# Patient Record
Sex: FEMALE | Race: WHITE | URBAN - METROPOLITAN AREA
[De-identification: names, ages, dates, MRNs, and addresses within clinical notes are randomized per-mention and may not be internally consistent; named-entity substitution may affect disease eponyms.]

---

## 2017-03-02 ENCOUNTER — OFFICE VISIT (OUTPATIENT)
Dept: CARDIOLOGY CLINIC | Age: 82
End: 2017-03-02

## 2017-03-02 VITALS
DIASTOLIC BLOOD PRESSURE: 77 MMHG | HEIGHT: 66 IN | HEART RATE: 50 BPM | SYSTOLIC BLOOD PRESSURE: 148 MMHG | BODY MASS INDEX: 27.8 KG/M2 | WEIGHT: 173 LBS

## 2017-03-02 DIAGNOSIS — I48.0 PAROXYSMAL ATRIAL FIBRILLATION (HCC): Primary | ICD-10-CM

## 2017-03-02 DIAGNOSIS — E78.00 HYPERCHOLESTEROLEMIA: ICD-10-CM

## 2017-03-02 DIAGNOSIS — I10 ESSENTIAL HYPERTENSION, BENIGN: ICD-10-CM

## 2017-03-02 DIAGNOSIS — I36.9 TRICUSPID VALVE DISORDERS, SPECIFIED AS NONRHEUMATIC: ICD-10-CM

## 2017-03-02 DIAGNOSIS — I08.0 MITRAL VALVE INSUFFICIENCY AND AORTIC VALVE INSUFFICIENCY: ICD-10-CM

## 2017-03-02 NOTE — LETTER
[place] 1935 
 
3/2/2017 Dear Gi Cartagena MD 
 
I had the pleasure of evaluating  Ms. Facundo Bray in office today. Below are the relevant portions of my assessment and plan of care. ICD-10-CM ICD-9-CM 1. Paroxysmal atrial fibrillation (HCC) I48.0 427.31   
 stable 
no recurrence 2. Essential hypertension, benign I10 401.1   
 mildly elevated 
monitor 3. Mitral valve insufficiency and aortic valve insufficiency I08.0 396.3   
 mild 
asymptomatic 4. Tricuspid valve disorders, specified as nonrheumatic I36.9 424.2   
 tr stable 5. Hypercholesterolemia E78.00 272.0   
 ldl 133 
9/2016 
discussed  diet Current Outpatient Prescriptions Medication Sig Dispense Refill  cholecalciferol (VITAMIN D3) 1,000 unit tablet Take  by mouth daily.  b complex-vitamin c-folic acid 5mg (DIATX,DEXFOL) tab tablet Take 1 tablet by mouth daily.  vitamin E (AQUA GEMS) 400 unit capsule Take  by mouth daily.  losartan (COZAAR) 25 mg tablet Take  by mouth daily.  diltiazem hcl 360 mg Tb24 Take  by mouth.  aspirin delayed-release 81 mg tablet Take  by mouth daily.  pravastatin (PRAVACHOL) 40 mg tablet Take 40 mg by mouth nightly.  gemfibrozil (LOPID) 600 mg tablet Take 600 mg by mouth two (2) times a day.  carvedilol (COREG) 12.5 mg tablet Take  by mouth two (2) times daily (with meals).  POLYETHYLENE GLYCOL 3350 (MIRALAX PO) Take  by mouth as needed.  Glucosamine Sulfate Sodium Cl 1,000 mg Tab Take  by mouth two (2) times a day.  multivitamin (ONE A DAY) tablet Take 1 Tab by mouth daily.  magnesium oxide 250 mg tablet Take 250 mg by mouth daily. No orders of the defined types were placed in this encounter. If you have questions, please do not hesitate to call me. I look forward to following Ms. Facundo Bray along with you. Sincerely, Africa Geller MD

## 2017-03-02 NOTE — PROGRESS NOTES
HISTORY OF PRESENT ILLNESS  To Olvera is a 80 y.o. female. HPI Comments: Patient with A fib,htn,mr,ai,tr. On follow up patient denies any chest pains,sob, palpitation or other significant symptoms. Irregular Heart Beat    Pertinent negatives include no fever, no chest pain, no claudication, no orthopnea, no PND, no abdominal pain, no nausea, no vomiting, no headaches, no dizziness, no weakness, no cough, no hemoptysis, no shortness of breath and no sputum production. Valvular Heart Disease   The history is provided by the patient. This is a chronic problem. The problem occurs constantly. The problem has not changed since onset. Pertinent negatives include no chest pain, no abdominal pain, no headaches and no shortness of breath. Review of Systems   Constitutional: Negative for chills and fever. HENT: Negative for nosebleeds. Eyes: Negative for blurred vision and double vision. Respiratory: Negative for cough, hemoptysis, sputum production, shortness of breath and wheezing. Cardiovascular: Positive for palpitations. Negative for chest pain, orthopnea, claudication, leg swelling and PND. Gastrointestinal: Negative for abdominal pain, heartburn, nausea and vomiting. Musculoskeletal: Negative for myalgias. Skin: Negative for rash. Neurological: Negative for dizziness, weakness and headaches. Endo/Heme/Allergies: Does not bruise/bleed easily.      Family History   Problem Relation Age of Onset    Heart Disease Neg Hx      No family history of heart disease       Past Medical History:   Diagnosis Date    Atrial fibrillation (HCC)     Back in sr, will continue pradaxa for 4 weeks, holter in 3 week and idecide on stopping pradaxa    Essential hypertension, benign     Mildly elevated    Hypercholesterolemia     Mitral valve insufficiency and aortic valve insufficiency     Mild ai, mild mr    Other and unspecified hyperlipidemia     Tricuspid valve disorders, specified as nonrheumatic Mild tr       Past Surgical History:   Procedure Laterality Date    HX TUBAL LIGATION         Social History   Substance Use Topics    Smoking status: Never Smoker    Smokeless tobacco: Never Used    Alcohol use No       No Known Allergies    Current Outpatient Prescriptions   Medication Sig    cholecalciferol (VITAMIN D3) 1,000 unit tablet Take  by mouth daily.  b complex-vitamin c-folic acid 5mg (DIATX,DEXFOL) tab tablet Take 1 tablet by mouth daily.  vitamin E (AQUA GEMS) 400 unit capsule Take  by mouth daily.  losartan (COZAAR) 25 mg tablet Take  by mouth daily.  diltiazem hcl 360 mg Tb24 Take  by mouth.  aspirin delayed-release 81 mg tablet Take  by mouth daily.  pravastatin (PRAVACHOL) 40 mg tablet Take 40 mg by mouth nightly.  gemfibrozil (LOPID) 600 mg tablet Take 600 mg by mouth two (2) times a day.  carvedilol (COREG) 12.5 mg tablet Take  by mouth two (2) times daily (with meals).  POLYETHYLENE GLYCOL 3350 (MIRALAX PO) Take  by mouth as needed.  Glucosamine Sulfate Sodium Cl 1,000 mg Tab Take  by mouth two (2) times a day.  multivitamin (ONE A DAY) tablet Take 1 Tab by mouth daily.  magnesium oxide 250 mg tablet Take 250 mg by mouth daily. No current facility-administered medications for this visit. Visit Vitals    /77    Pulse (!) 50    Ht 5' 6\" (1.676 m)    Wt 78.5 kg (173 lb)    BMI 27.92 kg/m2         Physical Exam   Constitutional: She is oriented to person, place, and time. She appears well-developed and well-nourished. HENT:   Head: Normocephalic and atraumatic. Eyes: Conjunctivae are normal.   Neck: Neck supple. No JVD present. No tracheal deviation present. No thyromegaly present. Cardiovascular: Normal rate and regular rhythm. PMI is not displaced. Exam reveals no gallop and no decreased pulses. Murmur heard. Early systolic murmur is present  at the upper right sternal border  Pulmonary/Chest: No respiratory distress. She has no wheezes. She has no rales. She exhibits no tenderness. Abdominal: Soft. There is no tenderness. Musculoskeletal: She exhibits no edema. Neurological: She is alert and oriented to person, place, and time. Skin: Skin is warm. Psychiatric: She has a normal mood and affect. Ms. James Trejo has a reminder for a \"due or due soon\" health maintenance. I have asked that she contact her primary care provider for follow-up on this health maintenance. CARDIOLOGY STUDIES 2/27/2013 2/1/2013   EKG Result - afib controlled vr.   Myocardial Perfusion Scan Result - normal   Echocardiogram - Complete Result - normal ef, mild mr, ai, tr.pap 32   24 hr Holter Monitor Result freq pac,pvc,short a tach rate 103 -         Assessment       ICD-10-CM ICD-9-CM    1. Paroxysmal atrial fibrillation (HCC) I48.0 427.31     stable  no recurrence   2. Essential hypertension, benign I10 401.1     mildly elevated  monitor   3. Mitral valve insufficiency and aortic valve insufficiency I08.0 396.3     mild  asymptomatic   4. Tricuspid valve disorders, specified as nonrheumatic I36.9 424.2     tr stable   5. Hypercholesterolemia E78.00 272.0     ldl 133  9/2016  discussed  diet       Medications Discontinued During This Encounter   Medication Reason    bilberry cap Not A Current Medication    omega-3 fatty acids-vitamin e (FISH OIL) 1,000 mg cap Not A Current Medication       No orders of the defined types were placed in this encounter. Follow-up Disposition:  Return in about 1 year (around 3/2/2018).

## 2017-03-02 NOTE — MR AVS SNAPSHOT
Visit Information Date & Time Provider Department Dept. Phone Encounter #  
 3/2/2017 11:00 AM Tyler Venegas MD [facility] [phone] Follow-up Instructions Return in about 1 year (around 3/2/2018). Upcoming Health Maintenance Date Due DTaP/Tdap/Td series (1 - Tdap) 6/17/1956 ZOSTER VACCINE AGE 60> 6/17/1997 GLAUCOMA SCREENING Q2Y 6/17/2002 OSTEOPOROSIS SCREENING (DEXA) 6/17/2002 Pneumococcal 65+ Low/Medium Risk (1 of 2 - PCV13) 6/17/2002 MEDICARE YEARLY EXAM 6/17/2002 INFLUENZA AGE 9 TO ADULT 8/1/2016 Allergies as of 3/2/2017  Review Complete On: 3/2/2017 By: Tyler Venegas MD  
 No Known Allergies Current Immunizations  Never Reviewed No immunizations on file. Not reviewed this visit You Were Diagnosed With   
  
 Codes Comments Paroxysmal atrial fibrillation (HCC)    -  Primary ICD-10-CM: I48.0 ICD-9-CM: 427.31 stable 
no recurrence Essential hypertension, benign     ICD-10-CM: I10 
ICD-9-CM: 401.1 mildly elevated 
monitor Mitral valve insufficiency and aortic valve insufficiency     ICD-10-CM: I08.0 ICD-9-CM: 396.3 mild 
asymptomatic Tricuspid valve disorders, specified as nonrheumatic     ICD-10-CM: I36.9 ICD-9-CM: 424.2 tr stable Hypercholesterolemia     ICD-10-CM: E78.00 ICD-9-CM: 272.0 ldl 133 
9/2016 
discussed  diet Vitals BP  
  
  
  
  
  
 148/77 Vitals History BMI and BSA Data Body Mass Index Body Surface Area  
 27.92 kg/m 2 1.91 m 2 Your Updated Medication List  
  
   
This list is accurate as of: 3/2/17 11:25 AM.  Always use your most recent med list.  
  
  
  
  
 aspirin delayed-release 81 mg tablet Take  by mouth daily. b complex-vitamin c-folic acid 5mg Tab tablet Commonly known as:  Chivo Gordon Take 1 tablet by mouth daily. COREG 12.5 mg tablet Generic drug:  carvedilol Take  by mouth two (2) times daily (with meals). diltiazem hcl 360 mg Tb24 Take  by mouth.  
  
 gemfibrozil 600 mg tablet Commonly known as:  LOPID Take 600 mg by mouth two (2) times a day. Glucosamine Sulfate Sodium Cl 1,000 mg Tab Take  by mouth two (2) times a day. losartan 25 mg tablet Commonly known as:  COZAAR Take  by mouth daily. magnesium oxide 250 mg tablet Take 250 mg by mouth daily. MIRALAX PO Take  by mouth as needed. multivitamin tablet Commonly known as:  ONE A DAY Take 1 Tab by mouth daily. pravastatin 40 mg tablet Commonly known as:  PRAVACHOL Take 40 mg by mouth nightly. VITAMIN D3 1,000 unit tablet Generic drug:  cholecalciferol Take  by mouth daily. vitamin E 400 unit capsule Commonly known as:  [address] Take  by mouth daily. Follow-up Instructions Return in about 1 year (around 3/2/2018). Introducing \[facility] [place] [facility]\"" & HEALTH SERVICES! Lennie Cristina introduces [employer] patient portal. Now you can access parts of your medical record, email your doctor's office, and request medication refills online. 1. In your internet browser, go to [email or web address] 2. Click on the First Time User? Click Here link in the Sign In box. You will see the New Member Sign Up page. 3. Enter your [employer] Access Code exactly as it appears below. You will not need to use this code after youve completed the sign-up process. If you do not sign up before the expiration date, you must request a new code. · [employer] Access Code: [email or web address] Expires: 5/31/2017 10:47 AM 
 
4. Enter the last four digits of your Social Security Number (xxxx) and Date of Birth (mm/dd/yyyy) as indicated and click Submit. You will be taken to the next sign-up page. 5. Create a [employer] ID. This will be your [employer] login ID and cannot be changed, so think of one that is secure and easy to remember. 6. Create a [employer] password. You can change your password at any time. 7. Enter your Password Reset Question and Answer. This can be used at a later time if you forget your password. 8. Enter your e-mail address. You will receive e-mail notification when new information is available in [address] 9. Click Sign Up. You can now view and download portions of your medical record. 10. Click the Download Summary menu link to download a portable copy of your medical information. If you have questions, please visit the Frequently Asked Questions section of the [employer] website. Remember, [employer] is NOT to be used for urgent needs. For medical emergencies, dial 911. Now available from your iPhone and Android! Please provide this summary of care documentation to your next provider. Your primary care clinician is listed as Diana Barber. If you have any questions after today's visit, please call [phone].

## 2018-03-06 ENCOUNTER — OFFICE VISIT (OUTPATIENT)
Dept: CARDIOLOGY CLINIC | Age: 83
End: 2018-03-06

## 2018-03-06 VITALS
SYSTOLIC BLOOD PRESSURE: 136 MMHG | HEART RATE: 58 BPM | BODY MASS INDEX: 27.97 KG/M2 | HEIGHT: 66 IN | WEIGHT: 174 LBS | DIASTOLIC BLOOD PRESSURE: 77 MMHG

## 2018-03-06 DIAGNOSIS — I48.0 PAROXYSMAL ATRIAL FIBRILLATION (HCC): Primary | ICD-10-CM

## 2018-03-06 DIAGNOSIS — E78.00 HYPERCHOLESTEROLEMIA: ICD-10-CM

## 2018-03-06 DIAGNOSIS — I08.0 MITRAL VALVE INSUFFICIENCY AND AORTIC VALVE INSUFFICIENCY: ICD-10-CM

## 2018-03-06 DIAGNOSIS — I10 ESSENTIAL HYPERTENSION, BENIGN: ICD-10-CM

## 2018-03-06 NOTE — PROGRESS NOTES
1. Have you been to the ER, urgent care clinic since your last visit? Hospitalized since your last visit? No    2. Have you seen or consulted any other health care providers outside of the [address] [place] [address] since your last visit? Include any pap smears or colon screening. Yes Where: PCP     3. Since your last visit, have you had any of the following symptoms? .           4. Have you had any blood work, X-rays or cardiac testing? No             5.  Where do you normally have your labs drawn? PCP Office    6. Do you need any refills today?    No

## 2018-03-06 NOTE — PROGRESS NOTES
HISTORY OF PRESENT ILLNESS  Matthew Bravo is a [de-identified] y.o. female. HPI Comments: Patient with A fib,htn,mr,ai,tr. On follow up patient denies any chest pains,sob, palpitation or other significant symptoms. Valvular Heart Disease   The history is provided by the patient. This is a chronic problem. The problem occurs constantly. The problem has not changed since onset. Pertinent negatives include no chest pain, no abdominal pain, no headaches and no shortness of breath. Palpitations    The history is provided by the patient. This is a recurrent problem. The problem has not changed since onset. The problem occurs constantly. The problem is associated with nothing. Pertinent negatives include no fever, no chest pain, no claudication, no orthopnea, no PND, no abdominal pain, no nausea, no vomiting, no headaches, no dizziness, no weakness, no cough, no hemoptysis, no shortness of breath and no sputum production. Review of Systems   Constitutional: Negative for chills and fever. HENT: Negative for nosebleeds. Eyes: Negative for blurred vision and double vision. Respiratory: Negative for cough, hemoptysis, sputum production, shortness of breath and wheezing. Cardiovascular: Positive for palpitations. Negative for chest pain, orthopnea, claudication, leg swelling and PND. Gastrointestinal: Negative for abdominal pain, heartburn, nausea and vomiting. Musculoskeletal: Negative for myalgias. Skin: Negative for rash. Neurological: Negative for dizziness, weakness and headaches. Endo/Heme/Allergies: Does not bruise/bleed easily.      Family History   Problem Relation Age of Onset    Heart Disease Neg Hx      No family history of heart disease       Past Medical History:   Diagnosis Date    Atrial fibrillation ([address]     Back in sr, will continue pradaxa for 4 weeks, holter in 3 week and idecide on stopping pradaxa    Essential hypertension, benign     Mildly elevated    Hypercholesterolemia     Mitral valve insufficiency and aortic valve insufficiency     Mild ai, mild mr    Other and unspecified hyperlipidemia     Tricuspid valve disorders, specified as nonrheumatic     Mild tr       Past Surgical History:   Procedure Laterality Date    HX TUBAL LIGATION         Social History   Substance Use Topics    Smoking status: Never Smoker    Smokeless tobacco: Never Used    Alcohol use No       No Known Allergies    Current Outpatient Prescriptions   Medication Sig    apixaban (ELIQUIS) 5 mg tablet Take 1 Tab by mouth two (2) times a day.  cholecalciferol (VITAMIN D3) 1,000 unit tablet Take  by mouth daily.  b complex-vitamin c-folic acid 5mg (DIATX,DEXFOL) tab tablet Take 1 tablet by mouth daily.  vitamin E (AQUA GEMS) 400 unit capsule Take  by mouth daily.  losartan (COZAAR) 25 mg tablet Take  by mouth daily.  diltiazem hcl 360 mg Tb24 Take  by mouth.  aspirin delayed-release 81 mg tablet Take  by mouth daily.  pravastatin (PRAVACHOL) 40 mg tablet Take 40 mg by mouth nightly.  gemfibrozil (LOPID) 600 mg tablet Take 600 mg by mouth two (2) times a day.  carvedilol (COREG) 12.5 mg tablet Take  by mouth two (2) times daily (with meals).  POLYETHYLENE GLYCOL 3350 (MIRALAX PO) Take  by mouth as needed.  Glucosamine Sulfate Sodium Cl 1,000 mg Tab Take  by mouth two (2) times a day.  multivitamin (ONE A DAY) tablet Take 1 Tab by mouth daily.  magnesium oxide 250 mg tablet Take 250 mg by mouth daily. No current facility-administered medications for this visit. Visit Vitals    /77    Pulse (!) 58    Ht 5' 6\" (1.676 m)    Wt 78.9 kg (174 lb)    BMI 28.08 kg/m2         Physical Exam   Constitutional: She is oriented to person, place, and time. She appears well-developed and well-nourished. HENT:   Head: Normocephalic and atraumatic. Eyes: Conjunctivae are normal.   Neck: Neck supple. No JVD present. No tracheal deviation present. No thyromegaly present. Cardiovascular: Normal rate and regular rhythm. PMI is not displaced. Exam reveals no gallop and no decreased pulses. Murmur heard. Early systolic murmur is present  at the upper right sternal border  Pulmonary/Chest: No respiratory distress. She has no wheezes. She has no rales. She exhibits no tenderness. Abdominal: Soft. There is no tenderness. Musculoskeletal: She exhibits no edema. Neurological: She is alert and oriented to person, place, and time. Skin: Skin is warm. Psychiatric: She has a normal mood and affect. Ms. Norma Nascimento has a reminder for a \"due or due soon\" health maintenance. I have asked that she contact her primary care provider for follow-up on this health maintenance. CARDIOLOGY STUDIES 2/27/2013 2/1/2013   EKG Result - afib controlled vr.   Myocardial Perfusion Scan Result - normal   Echocardiogram - Complete Result - normal ef, mild mr, ai, tr.pap 32   24 hr Holter Monitor Result freq pac,pvc,short a tach rate 103 -   Some recent data might be hidden         Assessment       ICD-10-CM ICD-9-CM    1. Paroxysmal atrial fibrillation (HCC) I48.0 427.31 AMB POC EKG ROUTINE W/ 12 LEADS, INTER & REP      2D ECHO COMPLETE ADULT (TTE)      CBC WITH AUTOMATED DIFF      METABOLIC PANEL, BASIC    recurrent  rate controlled  add anticoagulation   2. Essential hypertension, benign I10 401.1     controlled   3. Mitral valve insufficiency and aortic valve insufficiency I08.0 396.3     stable   4. Hypercholesterolemia E78.00 272.0     stable   3/2018-recurrent a fib  Start eliquis and stop asa  ? Rate control v/s rhythm control -decide at next visit  There are no discontinued medications.     Orders Placed This Encounter    CBC WITH AUTOMATED DIFF     Standing Status:   Future     Standing Expiration Date:   2/5/1699    METABOLIC PANEL, BASIC     Standing Status:   Future     Standing Expiration Date:   4/5/2018    2D ECHO COMPLETE ADULT (TTE)     Standing Status:   Future     Standing Expiration Date:   9/2/2018     Order Specific Question:   Reason for Exam:     Answer:   see diagnosis    AMB POC EKG ROUTINE W/ 12 LEADS, INTER & REP     Order Specific Question:   Reason for Exam:     Answer:   irregular heartbeat    apixaban (ELIQUIS) 5 mg tablet     Sig: Take 1 Tab by mouth two (2) times a day. Dispense:  60 Tab     Refill:  6       Follow-up Disposition:  Return in about 6 weeks (around 4/17/2018).

## 2018-03-06 NOTE — LETTER
Jacobo Bautista 1935 
 
3/6/2018 Dear Diane Terrell MD 
 
I had the pleasure of evaluating  Ms. Kimberly Scherer in office today. Below are the relevant portions of my assessment and plan of care. ICD-10-CM ICD-9-CM 1. Paroxysmal atrial fibrillation (HCC) I48.0 427.31 AMB POC EKG ROUTINE W/ 12 LEADS, INTER & REP  
   2D ECHO COMPLETE ADULT (TTE) CBC WITH AUTOMATED DIFF  
   METABOLIC PANEL, BASIC  
 recurrent 
rate controlled 
add anticoagulation 2. Essential hypertension, benign I10 401.1   
 controlled 3. Mitral valve insufficiency and aortic valve insufficiency I08.0 396.3   
 stable 4. Hypercholesterolemia E78.00 272.0   
 stable Current Outpatient Prescriptions Medication Sig Dispense Refill  apixaban (ELIQUIS) 5 mg tablet Take 1 Tab by mouth two (2) times a day. 60 Tab 6  cholecalciferol (VITAMIN D3) 1,000 unit tablet Take  by mouth daily.  b complex-vitamin c-folic acid 5mg (DIATX,DEXFOL) tab tablet Take 1 tablet by mouth daily.  vitamin E (AQUA GEMS) 400 unit capsule Take  by mouth daily.  losartan (COZAAR) 25 mg tablet Take  by mouth daily.  diltiazem hcl 360 mg Tb24 Take  by mouth.  aspirin delayed-release 81 mg tablet Take  by mouth daily.  pravastatin (PRAVACHOL) 40 mg tablet Take 40 mg by mouth nightly.  gemfibrozil (LOPID) 600 mg tablet Take 600 mg by mouth two (2) times a day.  carvedilol (COREG) 12.5 mg tablet Take  by mouth two (2) times daily (with meals).  POLYETHYLENE GLYCOL 3350 (MIRALAX PO) Take  by mouth as needed.  Glucosamine Sulfate Sodium Cl 1,000 mg Tab Take  by mouth two (2) times a day.  multivitamin (ONE A DAY) tablet Take 1 Tab by mouth daily.  magnesium oxide 250 mg tablet Take 250 mg by mouth daily. Orders Placed This Encounter  CBC WITH AUTOMATED DIFF Standing Status:   Future Standing Expiration Date:   4/5/2018  METABOLIC PANEL, BASIC Standing Status:   Future Standing Expiration Date:   4/5/2018  2D ECHO COMPLETE ADULT (TTE) Standing Status:   Future Standing Expiration Date:   9/2/2018 Order Specific Question:   Reason for Exam: Answer:   see diagnosis  AMB POC EKG ROUTINE W/ 12 LEADS, INTER & REP Order Specific Question:   Reason for Exam: Answer:   irregular heartbeat  apixaban (ELIQUIS) 5 mg tablet Sig: Take 1 Tab by mouth two (2) times a day. Dispense:  60 Tab Refill:  6 If you have questions, please do not hesitate to call me. I look forward to following Ms. Gino Rudolph along with you. Sincerely, Danielle Nichole MD

## 2018-03-22 ENCOUNTER — CLINICAL SUPPORT (OUTPATIENT)
Dept: CARDIOLOGY CLINIC | Age: 83
End: 2018-03-22

## 2018-03-22 DIAGNOSIS — I48.0 PAROXYSMAL ATRIAL FIBRILLATION (HCC): ICD-10-CM

## 2018-03-23 ENCOUNTER — TELEPHONE (OUTPATIENT)
Dept: CARDIOLOGY CLINIC | Age: 83
End: 2018-03-23

## 2018-03-23 NOTE — TELEPHONE ENCOUNTER
Patient has already used 2 weeks of free 30 day Eliquis. Will attempt to get more affordable price for her medication.

## 2018-03-29 NOTE — TELEPHONE ENCOUNTER
Shayla called to check on anticoagulant preference. They stated that no PA was needed for Eliquis and would cost $154.60 per month. Patient called to discuss this amount. She stated that she could not afford that amount of money per month. Coumadin was discussed as an alternative. Tier exception was also discussed. If tier exception is denied patient is agreeable to starting coumadin and having INR tested at [facility]. She voices understanding and acceptance of this advice and will call back if any further questions or concerns.       Tier exception reference # [id number] could take 24 to 72 hours for approval.

## 2018-03-30 NOTE — TELEPHONE ENCOUNTER
Patient called to discuss Eliquis being approved as a Tier 1 copay. She voices understanding and acceptance of this advice and will call back if any further questions or concerns.

## 2018-04-26 ENCOUNTER — OFFICE VISIT (OUTPATIENT)
Dept: CARDIOLOGY CLINIC | Age: 83
End: 2018-04-26

## 2018-04-26 VITALS
HEIGHT: 66 IN | BODY MASS INDEX: 28.12 KG/M2 | WEIGHT: 175 LBS | SYSTOLIC BLOOD PRESSURE: 122 MMHG | DIASTOLIC BLOOD PRESSURE: 78 MMHG | HEART RATE: 66 BPM

## 2018-04-26 DIAGNOSIS — I36.9 TRICUSPID VALVE DISORDERS, NON-RHEUMATIC: ICD-10-CM

## 2018-04-26 DIAGNOSIS — I10 ESSENTIAL HYPERTENSION, BENIGN: ICD-10-CM

## 2018-04-26 DIAGNOSIS — I48.0 PAROXYSMAL ATRIAL FIBRILLATION (HCC): Primary | ICD-10-CM

## 2018-04-26 DIAGNOSIS — E78.00 HYPERCHOLESTEROLEMIA: ICD-10-CM

## 2018-04-26 RX ORDER — LEVOTHYROXINE SODIUM 50 UG/1
TABLET ORAL
COMMUNITY

## 2018-04-26 NOTE — PROGRESS NOTES
HISTORY OF PRESENT ILLNESS  Hector Iniguez is a [de-identified] y.o. female. HPI Comments: Patient with A fib,htn,mr,ai,tr. On follow up patient denies any chest pains,sob, palpitation or other significant symptoms. Irregular Heart Beat    The history is provided by the patient. This is a recurrent problem. The problem has not changed since onset. The problem occurs constantly. The problem is associated with nothing. Pertinent negatives include no fever, no chest pain, no claudication, no orthopnea, no PND, no abdominal pain, no nausea, no vomiting, no headaches, no dizziness, no weakness, no cough, no hemoptysis, no shortness of breath and no sputum production. Valvular Heart Disease   The history is provided by the patient. This is a chronic problem. The problem occurs constantly. The problem has not changed since onset. Pertinent negatives include no chest pain, no abdominal pain, no headaches and no shortness of breath. Review of Systems   Constitutional: Negative for chills and fever. HENT: Negative for nosebleeds. Eyes: Negative for blurred vision and double vision. Respiratory: Negative for cough, hemoptysis, sputum production, shortness of breath and wheezing. Cardiovascular: Positive for palpitations. Negative for chest pain, orthopnea, claudication, leg swelling and PND. Gastrointestinal: Negative for abdominal pain, heartburn, nausea and vomiting. Musculoskeletal: Negative for myalgias. Skin: Negative for rash. Neurological: Negative for dizziness, weakness and headaches. Endo/Heme/Allergies: Does not bruise/bleed easily.      Family History   Problem Relation Age of Onset    Heart Disease Neg Hx      No family history of heart disease       Past Medical History:   Diagnosis Date    Atrial fibrillation ([address]     Back in sr, will continue pradaxa for 4 weeks, holter in 3 week and idecide on stopping pradaxa    Essential hypertension, benign     Mildly elevated    Hypercholesterolemia  Mitral valve insufficiency and aortic valve insufficiency     Mild ai, mild mr    Other and unspecified hyperlipidemia     Tricuspid valve disorders, non-rheumatic     Mild tr     Tricuspid valve disorders, specified as nonrheumatic     Mild tr       Past Surgical History:   Procedure Laterality Date    HX TUBAL LIGATION         Social History   Substance Use Topics    Smoking status: Never Smoker    Smokeless tobacco: Never Used    Alcohol use No       No Known Allergies    Current Outpatient Prescriptions   Medication Sig    levothyroxine (SYNTHROID) 50 mcg tablet Take  by mouth Daily (before breakfast).  apixaban (ELIQUIS) 5 mg tablet Take 1 Tab by mouth two (2) times a day.  cholecalciferol (VITAMIN D3) 1,000 unit tablet Take  by mouth daily.  b complex-vitamin c-folic acid 5mg (DIATX,DEXFOL) tab tablet Take 1 tablet by mouth daily.  vitamin E (AQUA GEMS) 400 unit capsule Take  by mouth daily.  losartan (COZAAR) 25 mg tablet Take  by mouth daily.  diltiazem hcl 360 mg Tb24 Take  by mouth.  pravastatin (PRAVACHOL) 40 mg tablet Take 40 mg by mouth nightly.  gemfibrozil (LOPID) 600 mg tablet Take 600 mg by mouth two (2) times a day.  carvedilol (COREG) 12.5 mg tablet Take  by mouth two (2) times daily (with meals).  POLYETHYLENE GLYCOL 3350 (MIRALAX PO) Take  by mouth as needed.  Glucosamine Sulfate Sodium Cl 1,000 mg Tab Take  by mouth two (2) times a day.  multivitamin (ONE A DAY) tablet Take 1 Tab by mouth daily.  magnesium oxide 250 mg tablet Take 250 mg by mouth daily. No current facility-administered medications for this visit. Visit Vitals    /78    Pulse 66    Ht 5' 6\" (1.676 m)    Wt 79.4 kg (175 lb)    BMI 28.25 kg/m2         Physical Exam   Constitutional: She is oriented to person, place, and time. She appears well-developed and well-nourished. HENT:   Head: Normocephalic and atraumatic.    Eyes: Conjunctivae are normal.   Neck: Neck supple. No JVD present. No tracheal deviation present. No thyromegaly present. Cardiovascular: Normal rate and regular rhythm. PMI is not displaced. Exam reveals no gallop and no decreased pulses. Murmur heard. Early systolic murmur is present  at the upper right sternal border  Pulmonary/Chest: No respiratory distress. She has no wheezes. She has no rales. She exhibits no tenderness. Abdominal: Soft. There is no tenderness. Musculoskeletal: She exhibits no edema. Neurological: She is alert and oriented to person, place, and time. Skin: Skin is warm. Psychiatric: She has a normal mood and affect. Ms. Stefanie Angel has a reminder for a \"due or due soon\" health maintenance. I have asked that she contact her primary care provider for follow-up on this health maintenance. CARDIOLOGY STUDIES 2/27/2013 2/1/2013   EKG Result - afib controlled vr.   Myocardial Perfusion Scan Result - normal   Echocardiogram - Complete Result - normal ef, mild mr, ai, tr.pap 32   24 hr Holter Monitor Result freq pac,pvc,short a tach rate 103 -   Some recent data might be hidden     SUMMARY:3/2018  Left ventricle: Systolic function was normal. Ejection fraction was  estimated in the range of 55 % to 60 %. There were no regional wall motion  abnormalities. Doppler parameters were consistent with abnormal left  ventricular relaxation (grade 1 diastolic dysfunction). Mitral valve: There was mild annular calcification. There was mild  regurgitation. Tricuspid valve: There was moderate to severe regurgitation. Pulmonic valve: There was mild regurgitation. I Have personally reviewed recent relevant labs available and discussed with patient    Assessment       ICD-10-CM ICD-9-CM    1. Paroxysmal atrial fibrillation (HCC) I48.0 427.31     stable     2. Tricuspid valve disorders, non-rheumatic I36.9 424.2     mod to severe  monitor  asymptomatic   3. Essential hypertension, benign I10 401.1     stable   4. Hypercholesterolemia E78.00 272.0     ldl 130  diet   3/2018-recurrent a fib  Start eliquis and stop asa  ? Rate control v/s rhythm control -decide at next visit    4/2018  Decided to continue rate control and anticoagulation  Medications Discontinued During This Encounter   Medication Reason    aspirin delayed-release 81 mg tablet Therapy Completed       No orders of the defined types were placed in this encounter. Follow-up Disposition:  Return in about 6 months (around 10/26/2018).

## 2018-04-26 NOTE — LETTER
Narayan Bennett 1935 4/26/2018 Dear Nnamdi Wilson MD 
 
I had the pleasure of evaluating  Ms. Renny Sanchez in office today. Below are the relevant portions of my assessment and plan of care. ICD-10-CM ICD-9-CM 1. Paroxysmal atrial fibrillation (HCC) I48.0 427.31   
 stable 2. Tricuspid valve disorders, non-rheumatic I36.9 424.2   
 mod to severe 
monitor 
asymptomatic 3. Essential hypertension, benign I10 401.1   
 stable 4. Hypercholesterolemia E78.00 272.0   
 ldl 130 
diet Current Outpatient Prescriptions Medication Sig Dispense Refill  levothyroxine (SYNTHROID) 50 mcg tablet Take  by mouth Daily (before breakfast).  apixaban (ELIQUIS) 5 mg tablet Take 1 Tab by mouth two (2) times a day. 180 Tab 3  cholecalciferol (VITAMIN D3) 1,000 unit tablet Take  by mouth daily.  b complex-vitamin c-folic acid 5mg (DIATX,DEXFOL) tab tablet Take 1 tablet by mouth daily.  vitamin E (AQUA GEMS) 400 unit capsule Take  by mouth daily.  losartan (COZAAR) 25 mg tablet Take  by mouth daily.  diltiazem hcl 360 mg Tb24 Take  by mouth.  pravastatin (PRAVACHOL) 40 mg tablet Take 40 mg by mouth nightly.  gemfibrozil (LOPID) 600 mg tablet Take 600 mg by mouth two (2) times a day.  carvedilol (COREG) 12.5 mg tablet Take  by mouth two (2) times daily (with meals).  POLYETHYLENE GLYCOL 3350 (MIRALAX PO) Take  by mouth as needed.  Glucosamine Sulfate Sodium Cl 1,000 mg Tab Take  by mouth two (2) times a day.  multivitamin (ONE A DAY) tablet Take 1 Tab by mouth daily.  magnesium oxide 250 mg tablet Take 250 mg by mouth daily. Orders Placed This Encounter  levothyroxine (SYNTHROID) 50 mcg tablet Sig: Take  by mouth Daily (before breakfast). If you have questions, please do not hesitate to call me. I look forward to following Ms. Renny Sanchez along with you. Sincerely, Costa Ruano MD

## 2018-04-26 NOTE — MR AVS SNAPSHOT
[facility] 
 
 
 [address] [facility] 
[phone] Patient: Lianna Thurston MRN: [id number] Visit Information Date & Time Provider Department Dept. Phone Encounter #  
 4/26/2018 10:45 AM Svitlana Sotelo MD [facility] [id number] Follow-up Instructions Return in about 6 months (around 10/26/2018). Upcoming Health Maintenance Date Due DTaP/Tdap/Td series (1 - Tdap) 6/17/1958 ZOSTER VACCINE AGE 60> 4/17/1997 GLAUCOMA SCREENING Q2Y 6/17/2002 Bone Densitometry (Dexa) Screening 6/17/2002 Pneumococcal 65+ Low/Medium Risk (1 of 2 - PCV13) 6/17/2002 MEDICARE YEARLY EXAM 3/14/2018 Allergies as of 4/26/2018  Review Complete On: 4/26/2018 By: Svitlana Sotelo MD  
 No Known Allergies Current Immunizations  Never Reviewed No immunizations on file. Not reviewed this visit You Were Diagnosed With   
  
 Codes Comments Paroxysmal atrial fibrillation (HCC)    -  Primary ICD-10-CM: I48.0 ICD-9-CM: 427.31 stable Tricuspid valve disorders, non-rheumatic     ICD-10-CM: I36.9 ICD-9-CM: 424.2 mod to severe 
monitor 
asymptomatic Essential hypertension, benign     ICD-10-CM: I10 
ICD-9-CM: 401.1 stable Hypercholesterolemia     ICD-10-CM: E78.00 ICD-9-CM: 272.0 ldl 130 
diet Vitals BP Pulse Height(growth percentile) Weight(growth percentile) BMI Smoking Status 122/78 66 5' 6\" (1.676 m) 175 lb (79.4 kg) 28.25 kg/m2 Never Smoker Vitals History BMI and BSA Data Body Mass Index Body Surface Area  
 28.25 kg/m 2 1.92 m 2 Preferred Pharmacy Pharmacy Name Phone [address] [place] [address] [phone] Your Updated Medication List  
  
   
This list is accurate as of 4/26/18 11:02 AM.  Always use your most recent med list.  
  
  
  
  
 apixaban 5 mg tablet Commonly known as:  Frank Gentleman Take 1 Tab by mouth two (2) times a day. b complex-vitamin c-folic acid 5mg Tab tablet Commonly known as:  Cooper Mo Take 1 tablet by mouth daily. COREG 12.5 mg tablet Generic drug:  carvedilol Take  by mouth two (2) times daily (with meals). dilTIAZem  mg Tb24 tablet Commonly known as:  CARDIZEM LA Take  by mouth.  
  
 gemfibrozil 600 mg tablet Commonly known as:  LOPID Take 600 mg by mouth two (2) times a day. Glucosamine Sulfate Sodium Cl 1,000 mg Tab Take  by mouth two (2) times a day. levothyroxine 50 mcg tablet Commonly known as:  SYNTHROID Take  by mouth Daily (before breakfast). losartan 25 mg tablet Commonly known as:  COZAAR Take  by mouth daily. magnesium oxide 250 mg tablet Take 250 mg by mouth daily. MIRALAX PO Take  by mouth as needed. multivitamin tablet Commonly known as:  ONE A DAY Take 1 Tab by mouth daily. pravastatin 40 mg tablet Commonly known as:  PRAVACHOL Take 40 mg by mouth nightly. VITAMIN D3 1,000 unit tablet Generic drug:  cholecalciferol Take  by mouth daily. vitamin E 400 unit capsule Commonly known as:  [address] Take  by mouth daily. Follow-up Instructions Return in about 6 months (around 10/26/2018). Introducing [facility]! Maile Mckinney introduces [employer] hell[employer]ood patient portal. Now you can access parts of your medical record, email your doctor's office, and request medication refills online. 1. In your internet browser, go to [email or web address] 2. Click on the First Time User? Click Here link in the Sign In box. You will see the New Member Sign Up page. 3. Enter your [employer] Access Code exactly as it appears below. You will not need to use this code after youve completed the sign-up process. If you do not sign up before the expiration date, you must request a new code. · ZENT Access Code: [email or web address] Expires: 6/4/2018 10:18 AM 
 
4. Enter the last four digits of your Social Security Number (xxxx) and Date of Birth (mm/dd/yyyy) as indicated and click Submit. You will be taken to the next sign-up page. 5. Create a ZENT ID. This will be your ZENT login ID and cannot be changed, so think of one that is secure and easy to remember. 6. Create a ZENT password. You can change your password at any time. 7. Enter your Password Reset Question and Answer. This can be used at a later time if you forget your password. 8. Enter your e-mail address. You will receive e-mail notification when new information is available in [address] 9. Click Sign Up. You can now view and download portions of your medical record. 10. Click the Download Summary menu link to download a portable copy of your medical information. If you have questions, please visit the Frequently Asked Questions section of the ZENT website. Remember, ZENT is NOT to be used for urgent needs. For medical emergencies, dial 911. Now available from your iPhone and Android! Please provide this summary of care documentation to your next provider. Your primary care clinician is listed as Krysta Colin. If you have any questions after today's visit, please call [phone].

## 2018-10-25 ENCOUNTER — OFFICE VISIT (OUTPATIENT)
Dept: CARDIOLOGY CLINIC | Age: 83
End: 2018-10-25

## 2018-10-25 VITALS
HEIGHT: 66 IN | HEART RATE: 60 BPM | SYSTOLIC BLOOD PRESSURE: 146 MMHG | DIASTOLIC BLOOD PRESSURE: 71 MMHG | BODY MASS INDEX: 27.8 KG/M2 | WEIGHT: 173 LBS

## 2018-10-25 DIAGNOSIS — I48.0 PAROXYSMAL ATRIAL FIBRILLATION (HCC): Primary | ICD-10-CM

## 2018-10-25 DIAGNOSIS — I10 ESSENTIAL HYPERTENSION, BENIGN: ICD-10-CM

## 2018-10-25 DIAGNOSIS — I08.0 MITRAL VALVE INSUFFICIENCY AND AORTIC VALVE INSUFFICIENCY: ICD-10-CM

## 2018-10-25 DIAGNOSIS — E78.00 HYPERCHOLESTEROLEMIA: ICD-10-CM

## 2018-10-25 DIAGNOSIS — I36.9 TRICUSPID VALVE DISORDERS, NON-RHEUMATIC: ICD-10-CM

## 2018-10-25 RX ORDER — BACLOFEN 10 MG/1
TABLET ORAL AS NEEDED
COMMUNITY

## 2018-10-25 NOTE — PROGRESS NOTES
1. Have you been to the ER, urgent care clinic since your last visit? Hospitalized since your last visit?     no  2. Have you seen or consulted any other health care providers outside of the [address] [place] [address] since your last visit? Include any pap smears or colon screening. Yes Where: pcp     3. Since your last visit, have you had any of the following symptoms?      swelling in legs/arms.

## 2018-10-25 NOTE — LETTER
Andreina Pascal 1935 
 
10/25/2018 Dear Olena Canales MD 
 
I had the pleasure of evaluating  Ms. Anais Marin in office today. Below are the relevant portions of my assessment and plan of care. ICD-10-CM ICD-9-CM 1. Paroxysmal atrial fibrillation (HCC) I48.0 427.31   
2. Essential hypertension, benign I10 401.1 3. Hypercholesterolemia E78.00 272.0   
4. Mitral valve insufficiency and aortic valve insufficiency I08.0 396.3 5. Tricuspid valve disorders, non-rheumatic I36.9 424.2 Current Outpatient Medications Medication Sig Dispense Refill  baclofen (LIORESAL) 10 mg tablet Take  by mouth as needed.  levothyroxine (SYNTHROID) 50 mcg tablet Take  by mouth Daily (before breakfast).  apixaban (ELIQUIS) 5 mg tablet Take 1 Tab by mouth two (2) times a day. 180 Tab 3  cholecalciferol (VITAMIN D3) 1,000 unit tablet Take  by mouth daily.  b complex-vitamin c-folic acid 5mg (DIATX,DEXFOL) tab tablet Take 1 tablet by mouth daily.  losartan (COZAAR) 25 mg tablet Take  by mouth daily.  diltiazem hcl 360 mg Tb24 Take  by mouth.  pravastatin (PRAVACHOL) 40 mg tablet Take 40 mg by mouth nightly.  gemfibrozil (LOPID) 600 mg tablet Take 600 mg by mouth two (2) times a day.  carvedilol (COREG) 12.5 mg tablet Take  by mouth two (2) times daily (with meals).  POLYETHYLENE GLYCOL 3350 (MIRALAX PO) Take  by mouth as needed.  Glucosamine Sulfate Sodium Cl 1,000 mg Tab Take  by mouth two (2) times a day.  multivitamin (ONE A DAY) tablet Take 1 Tab by mouth daily.  vitamin E (AQUA GEMS) 400 unit capsule Take  by mouth daily.  magnesium oxide 250 mg tablet Take 250 mg by mouth daily. Orders Placed This Encounter  baclofen (LIORESAL) 10 mg tablet Sig: Take  by mouth as needed. If you have questions, please do not hesitate to call me. I look forward to following Ms. Anais Marin along with you. Sincerely, Hernandez Solano MD

## 2018-10-25 NOTE — PROGRESS NOTES
HISTORY OF PRESENT ILLNESS  Eloisa Colbert is a 82 y.o. female. Patient with A fib,htn,mr,ai,tr. On follow up patient denies any chest pains,sob, palpitation or other significant symptoms. Palpitations    The history is provided by the patient. This is a recurrent problem. The problem has not changed since onset. The problem occurs constantly. The problem is associated with nothing. Pertinent negatives include no fever, no chest pain, no claudication, no orthopnea, no PND, no abdominal pain, no nausea, no vomiting, no headaches, no dizziness, no weakness, no cough, no hemoptysis, no shortness of breath and no sputum production. Valvular Heart Disease   The history is provided by the patient. This is a chronic problem. The problem occurs constantly. The problem has not changed since onset. Pertinent negatives include no chest pain, no abdominal pain, no headaches and no shortness of breath. Review of Systems   Constitutional: Negative for chills and fever. HENT: Negative for nosebleeds. Eyes: Negative for blurred vision and double vision. Respiratory: Negative for cough, hemoptysis, sputum production, shortness of breath and wheezing. Cardiovascular: Positive for palpitations. Negative for chest pain, orthopnea, claudication, leg swelling and PND. Gastrointestinal: Negative for abdominal pain, heartburn, nausea and vomiting. Musculoskeletal: Negative for myalgias. Skin: Negative for rash. Neurological: Negative for dizziness, weakness and headaches. Endo/Heme/Allergies: Does not bruise/bleed easily.      Family History   Problem Relation Age of Onset    Heart Disease Neg Hx         No family history of heart disease       Past Medical History:   Diagnosis Date    Atrial fibrillation ([address]     Back in sr, will continue pradaxa for 4 weeks, holter in 3 week and idecide on stopping pradaxa    Essential hypertension, benign     Mildly elevated    Hypercholesterolemia     Mitral valve insufficiency and aortic valve insufficiency     Mild ai, mild mr    Other and unspecified hyperlipidemia     Tricuspid valve disorders, non-rheumatic     Mild tr     Tricuspid valve disorders, specified as nonrheumatic     Mild tr       Past Surgical History:   Procedure Laterality Date    HX TUBAL LIGATION         Social History     Tobacco Use    Smoking status: Never Smoker    Smokeless tobacco: Never Used   Substance Use Topics    Alcohol use: No       No Known Allergies    Current Outpatient Medications   Medication Sig    baclofen (LIORESAL) 10 mg tablet Take  by mouth as needed.  levothyroxine (SYNTHROID) 50 mcg tablet Take  by mouth Daily (before breakfast).  apixaban (ELIQUIS) 5 mg tablet Take 1 Tab by mouth two (2) times a day.  cholecalciferol (VITAMIN D3) 1,000 unit tablet Take  by mouth daily.  b complex-vitamin c-folic acid 5mg (DIATX,DEXFOL) tab tablet Take 1 tablet by mouth daily.  losartan (COZAAR) 25 mg tablet Take  by mouth daily.  diltiazem hcl 360 mg Tb24 Take  by mouth.  pravastatin (PRAVACHOL) 40 mg tablet Take 40 mg by mouth nightly.  gemfibrozil (LOPID) 600 mg tablet Take 600 mg by mouth two (2) times a day.  carvedilol (COREG) 12.5 mg tablet Take  by mouth two (2) times daily (with meals).  POLYETHYLENE GLYCOL 3350 (MIRALAX PO) Take  by mouth as needed.  Glucosamine Sulfate Sodium Cl 1,000 mg Tab Take  by mouth two (2) times a day.  multivitamin (ONE A DAY) tablet Take 1 Tab by mouth daily.  vitamin E (AQUA GEMS) 400 unit capsule Take  by mouth daily.  magnesium oxide 250 mg tablet Take 250 mg by mouth daily. No current facility-administered medications for this visit. Visit Vitals  /71   Pulse 60   Ht 5' 6\" (1.676 m)   Wt 78.5 kg (173 lb)   BMI 27.92 kg/m²         Physical Exam   Constitutional: She is oriented to person, place, and time. She appears well-developed and well-nourished.    HENT:   Head: Normocephalic and atraumatic. Eyes: Conjunctivae are normal.   Neck: Neck supple. No JVD present. No tracheal deviation present. No thyromegaly present. Cardiovascular: PMI is not displaced. Exam reveals no gallop and no decreased pulses. Murmur heard. Early systolic murmur is present at the upper right sternal border. Irregularly irregular rate and rhythm   Pulmonary/Chest: No respiratory distress. She has no wheezes. She has no rales. She exhibits no tenderness. Abdominal: Soft. There is no tenderness. Musculoskeletal: She exhibits no edema. Neurological: She is alert and oriented to person, place, and time. Skin: Skin is warm. Psychiatric: She has a normal mood and affect. Ms. Breann Carpio has a reminder for a \"due or due soon\" health maintenance. I have asked that she contact her primary care provider for follow-up on this health maintenance. No flowsheet data found. SUMMARY:3/2018  Left ventricle: Systolic function was normal. Ejection fraction was  estimated in the range of 55 % to 60 %. There were no regional wall motion  abnormalities. Doppler parameters were consistent with abnormal left  ventricular relaxation (grade 1 diastolic dysfunction). Mitral valve: There was mild annular calcification. There was mild  regurgitation. Tricuspid valve: There was moderate to severe regurgitation. Pulmonic valve: There was mild regurgitation. I Have personally reviewed recent relevant labs available and discussed with patient    Assessment       ICD-10-CM ICD-9-CM    1. Paroxysmal atrial fibrillation (HCC) I48.0 427.31     Persistent - rated controlled, AC with Eliquis   2. Essential hypertension, benign I10 401.1     Controlled   3. Hypercholesterolemia E78.00 272.0     On statin - lipids followed by PCP   4. Mitral valve insufficiency and aortic valve insufficiency I08.0 396.3     Stable, monitor   5.  Tricuspid valve disorders, non-rheumatic I36.9 424.2    3/2018-recurrent a fib  Start eliquis and stop asa  ? Rate control v/s rhythm control -decide at next visit    4/2018  Decided to continue rate control and anticoagulation  There are no discontinued medications. 10/2018 - Stable chronic Atrial fibrillation - rate controlled with coreg, AC with Eliquis. No orders of the defined types were placed in this encounter. Follow-up Disposition:  Return in about 6 months (around 4/25/2019), or if symptoms worsen or fail to improve. I have independently evaluated taken history and examined the patient. All relevant labs and testing data's are reviewed. Care plan discussed and updated after review.   Mary Cleary MD

## 2018-11-13 ENCOUNTER — TELEPHONE (OUTPATIENT)
Dept: CARDIOLOGY CLINIC | Age: 83
End: 2018-11-13

## 2018-11-13 NOTE — TELEPHONE ENCOUNTER
Patient was called and per Dr. Monae mitchell to hold eliquis for procedure as needed. She voices understanding and acceptance of this advice and will call back if any further questions or concerns.

## 2018-11-13 NOTE — TELEPHONE ENCOUNTER
Patient states she will be having skin cancer removed by Dr. Tru Forde on 11/19/18. Patient want to know if she can hold eliquis 24 hours prior to surgery.  Please advise

## 2019-03-14 ENCOUNTER — TELEPHONE (OUTPATIENT)
Dept: CARDIOLOGY CLINIC | Age: 84
End: 2019-03-14

## 2019-03-14 NOTE — TELEPHONE ENCOUNTER
Patient called and stated she will be having a tooth extracted by Dr. Doran Coma office and they want know can she be cleared and how many days does she need to hold eliquis.  Please advise

## 2019-03-15 NOTE — TELEPHONE ENCOUNTER
Patient called back per Dr. Tom Cavanaugh for dental procedure and to hold eliquis for 3 days. She voices understanding and acceptance of this advice and will call back if any further questions or concerns.

## 2019-04-16 RX ORDER — APIXABAN 5 MG/1
TABLET, FILM COATED ORAL
Qty: 180 TAB | Refills: 3 | Status: SHIPPED | OUTPATIENT
Start: 2019-04-16 | End: 2020-06-24 | Stop reason: SDUPTHER

## 2019-04-25 ENCOUNTER — OFFICE VISIT (OUTPATIENT)
Dept: CARDIOLOGY CLINIC | Age: 84
End: 2019-04-25

## 2019-04-25 VITALS
BODY MASS INDEX: 28.12 KG/M2 | HEIGHT: 66 IN | HEART RATE: 47 BPM | WEIGHT: 175 LBS | DIASTOLIC BLOOD PRESSURE: 77 MMHG | SYSTOLIC BLOOD PRESSURE: 151 MMHG

## 2019-04-25 DIAGNOSIS — I36.9 TRICUSPID VALVE DISORDERS, NON-RHEUMATIC: ICD-10-CM

## 2019-04-25 DIAGNOSIS — I08.0 MITRAL VALVE INSUFFICIENCY AND AORTIC VALVE INSUFFICIENCY: ICD-10-CM

## 2019-04-25 DIAGNOSIS — I48.0 PAROXYSMAL ATRIAL FIBRILLATION (HCC): Primary | ICD-10-CM

## 2019-04-25 DIAGNOSIS — E78.00 HYPERCHOLESTEROLEMIA: ICD-10-CM

## 2019-04-25 DIAGNOSIS — I10 ESSENTIAL HYPERTENSION, BENIGN: ICD-10-CM

## 2019-04-25 NOTE — PROGRESS NOTES
HISTORY OF PRESENT ILLNESS Mariya Anaya is a 82 y.o. female. Patient with A fib,htn,mr,ai,tr. On follow up patient denies any chest pains,sob, palpitation or other significant symptoms. Palpitations The history is provided by the patient. This is a recurrent problem. The problem has not changed since onset. The problem occurs constantly. The problem is associated with nothing. Pertinent negatives include no fever, no chest pain, no claudication, no orthopnea, no PND, no abdominal pain, no nausea, no vomiting, no headaches, no dizziness, no weakness, no cough, no hemoptysis, no shortness of breath and no sputum production. Valvular Heart Disease The history is provided by the patient. This is a chronic problem. The problem occurs constantly. The problem has not changed since onset. Pertinent negatives include no chest pain, no abdominal pain, no headaches and no shortness of breath. Review of Systems Constitutional: Negative for chills and fever. HENT: Negative for nosebleeds. Eyes: Negative for blurred vision and double vision. Respiratory: Negative for cough, hemoptysis, sputum production, shortness of breath and wheezing. Cardiovascular: Positive for palpitations. Negative for chest pain, orthopnea, claudication, leg swelling and PND. Gastrointestinal: Negative for abdominal pain, heartburn, nausea and vomiting. Musculoskeletal: Negative for myalgias. Skin: Negative for rash. Neurological: Negative for dizziness, weakness and headaches. Endo/Heme/Allergies: Does not bruise/bleed easily. Family History Problem Relation Age of Onset  Heart Disease Neg Hx No family history of heart disease Past Medical History:  
Diagnosis Date  Atrial fibrillation ([address] Back in sr, will continue pradaxa for 4 weeks, holter in 3 week and idecide on stopping pradaxa  Essential hypertension, benign Mildly elevated  Hypercholesterolemia  Mitral valve insufficiency and aortic valve insufficiency Mild ai, mild mr  
 Other and unspecified hyperlipidemia  Tricuspid valve disorders, non-rheumatic Mild tr  Tricuspid valve disorders, specified as nonrheumatic Mild tr  
 
 
Past Surgical History:  
Procedure Laterality Date  HX TUBAL LIGATION Social History Tobacco Use  Smoking status: Never Smoker  Smokeless tobacco: Never Used Substance Use Topics  Alcohol use: No  
 
 
No Known Allergies Current Outpatient Medications Medication Sig  ELIQUIS 5 mg tablet TAKE 1 TABLET TWICE DAILY  baclofen (LIORESAL) 10 mg tablet Take  by mouth as needed.  levothyroxine (SYNTHROID) 50 mcg tablet Take  by mouth Daily (before breakfast).  cholecalciferol (VITAMIN D3) 1,000 unit tablet Take  by mouth daily.  b complex-vitamin c-folic acid 5mg (DIATX,DEXFOL) tab tablet Take 1 tablet by mouth daily.  losartan (COZAAR) 25 mg tablet Take  by mouth daily.  diltiazem hcl 360 mg Tb24 Take  by mouth.  pravastatin (PRAVACHOL) 40 mg tablet Take 40 mg by mouth nightly.  gemfibrozil (LOPID) 600 mg tablet Take 600 mg by mouth two (2) times a day.  carvedilol (COREG) 12.5 mg tablet Take  by mouth two (2) times daily (with meals).  POLYETHYLENE GLYCOL 3350 (MIRALAX PO) Take  by mouth as needed.  Glucosamine Sulfate Sodium Cl 1,000 mg Tab Take  by mouth two (2) times a day.  multivitamin (ONE A DAY) tablet Take 1 Tab by mouth daily.  vitamin E (AQUA GEMS) 400 unit capsule Take  by mouth daily.  magnesium oxide 250 mg tablet Take 250 mg by mouth daily. No current facility-administered medications for this visit. Visit Vitals /77 Pulse (!) 47 Ht 5' 6\" (1.676 m) Wt 79.4 kg (175 lb) BMI 28.25 kg/m² Physical Exam  
Constitutional: She is oriented to person, place, and time. She appears well-developed and well-nourished.   
HENT:  
 Head: Normocephalic and atraumatic. Eyes: Conjunctivae are normal.  
Neck: Neck supple. No JVD present. No tracheal deviation present. No thyromegaly present. Cardiovascular: Normal rate and regular rhythm. PMI is not displaced. Exam reveals no gallop and no decreased pulses. Murmur heard. Early systolic murmur is present at the upper right sternal border. Pulmonary/Chest: No respiratory distress. She has no wheezes. She has no rales. She exhibits no tenderness. Abdominal: Soft. There is no tenderness. Musculoskeletal: She exhibits no edema. Neurological: She is alert and oriented to person, place, and time. Skin: Skin is warm. Psychiatric: She has a normal mood and affect. Ms. Tiffanie Sinha has a reminder for a \"due or due soon\" health maintenance. I have asked that she contact her primary care provider for follow-up on this health maintenance. No flowsheet data found. SUMMARY:3/2018 Left ventricle: Systolic function was normal. Ejection fraction was 
estimated in the range of 55 % to 60 %. There were no regional wall motion 
abnormalities. Doppler parameters were consistent with abnormal left 
ventricular relaxation (grade 1 diastolic dysfunction). Mitral valve: There was mild annular calcification. There was mild 
regurgitation. Tricuspid valve: There was moderate to severe regurgitation. Pulmonic valve: There was mild regurgitation. I Have personally reviewed recent relevant labs available and discussed with patient Assessment ICD-10-CM ICD-9-CM 1. Paroxysmal atrial fibrillation (HCC) I48.0 427.31 Stable rate controlled. Maintain on anticoagulation 2. Essential hypertension, benign I10 401.1 Mildly elevated monitor 3. Hypercholesterolemia E78.00 272.0 Continue medication lab with PCP 4. Mitral valve insufficiency and aortic valve insufficiency I08.0 396.3 Stable normal ejection fraction 5. Tricuspid valve disorders, non-rheumatic I36.9 424.2 Stable asymptomatic 3/2018-recurrent a fib Start eliquis and stop asa 
? Rate control v/s rhythm control -decide at next visit 4/2018 Decided to continue rate control and anticoagulation There are no discontinued medications. No orders of the defined types were placed in this encounter. Follow-up and Dispositions · Return in about 6 months (around 10/25/2019).

## 2019-04-25 NOTE — PATIENT INSTRUCTIONS
Blurtt Activation Thank you for requesting access to Blurtt. Please follow the instructions below to securely access and download your online medical record. Blurtt allows you to send messages to your doctor, view your test results, renew your prescriptions, schedule appointments, and more. How Do I Sign Up? 1. In your internet browser, go to [email or web address]. 2. Click on the First Time User? Click Here link in the Sign In box. You will see the New Member Sign Up page. 3. Enter your Blurtt Access Code exactly as it appears below. You will not need to use this code after youve completed the sign-up process. If you do not sign up before the expiration date, you must request a new code. Blurtt Access Code: [email or web address] Expires: 2019 10:36 AM (This is the date your Blurtt access code will ) 4. Enter the last four digits of your Social Security Number (xxxx) and Date of Birth (mm/dd/yyyy) as indicated and click Submit. You will be taken to the next sign-up page. 5. Create a Blurtt ID. This will be your Blurtt login ID and cannot be changed, so think of one that is secure and easy to remember. 6. Create a Blurtt password. You can change your password at any time. 7. Enter your Password Reset Question and Answer. This can be used at a later time if you forget your password. 8. Enter your e-mail address. You will receive e-mail notification when new information is available in [address] 9. Click Sign Up. You can now view and download portions of your medical record. 10. Click the Download Summary menu link to download a portable copy of your medical information. Additional Information If you have questions, please visit the Frequently Asked Questions section of the Blurtt website at [email or web address] Remember, Blurtt is NOT to be used for urgent needs. For medical emergencies, dial 911.

## 2019-10-24 ENCOUNTER — OFFICE VISIT (OUTPATIENT)
Dept: CARDIOLOGY CLINIC | Age: 84
End: 2019-10-24

## 2019-10-24 VITALS
SYSTOLIC BLOOD PRESSURE: 140 MMHG | HEIGHT: 66 IN | DIASTOLIC BLOOD PRESSURE: 78 MMHG | BODY MASS INDEX: 28.61 KG/M2 | HEART RATE: 48 BPM | WEIGHT: 178 LBS

## 2019-10-24 DIAGNOSIS — I48.0 PAROXYSMAL ATRIAL FIBRILLATION (HCC): Primary | ICD-10-CM

## 2019-10-24 DIAGNOSIS — E78.00 HYPERCHOLESTEROLEMIA: ICD-10-CM

## 2019-10-24 DIAGNOSIS — I36.9 TRICUSPID VALVE DISORDERS, NON-RHEUMATIC: ICD-10-CM

## 2019-10-24 DIAGNOSIS — Z79.01 CURRENT USE OF LONG TERM ANTICOAGULATION: ICD-10-CM

## 2019-10-24 DIAGNOSIS — I10 ESSENTIAL HYPERTENSION, BENIGN: ICD-10-CM

## 2019-10-24 DIAGNOSIS — I08.0 MITRAL VALVE INSUFFICIENCY AND AORTIC VALVE INSUFFICIENCY: ICD-10-CM

## 2019-10-24 NOTE — PROGRESS NOTES
1. Have you been to the ER, urgent care clinic since your last visit? Hospitalized since your last visit? no    2. Have you seen or consulted any other health care providers outside of the [address] [place] [address] since your last visit? Include any pap smears or colon screening.  Yes, pcp

## 2019-10-24 NOTE — PROGRESS NOTES
HISTORY OF PRESENT ILLNESS  Isacc Richter is a 82 y.o. female. Patient with A fib,htn,mr,ai,tr. On follow up patient denies any chest pains,sob, palpitation or other significant symptoms. Palpitations    The history is provided by the patient. This is a recurrent problem. The problem has not changed since onset. The problem occurs constantly. The problem is associated with nothing. Pertinent negatives include no fever, no chest pain, no claudication, no orthopnea, no PND, no abdominal pain, no nausea, no vomiting, no headaches, no dizziness, no weakness, no cough, no hemoptysis, no shortness of breath and no sputum production. Valvular Heart Disease   The history is provided by the patient. This is a chronic problem. The problem occurs constantly. The problem has not changed since onset. Pertinent negatives include no chest pain, no abdominal pain, no headaches and no shortness of breath. Review of Systems   Constitutional: Negative for chills and fever. HENT: Negative for nosebleeds. Eyes: Negative for blurred vision and double vision. Respiratory: Negative for cough, hemoptysis, sputum production, shortness of breath and wheezing. Cardiovascular: Positive for palpitations. Negative for chest pain, orthopnea, claudication, leg swelling and PND. Gastrointestinal: Negative for abdominal pain, heartburn, nausea and vomiting. Musculoskeletal: Negative for myalgias. Skin: Negative for rash. Neurological: Negative for dizziness, weakness and headaches. Endo/Heme/Allergies: Does not bruise/bleed easily.      Family History   Problem Relation Age of Onset    Heart Disease Neg Hx         No family history of heart disease       Past Medical History:   Diagnosis Date    Atrial fibrillation ([address]     Back in sr, will continue pradaxa for 4 weeks, holter in 3 week and idecide on stopping pradaxa    Essential hypertension, benign     Mildly elevated    Hypercholesterolemia     Mitral valve insufficiency and aortic valve insufficiency     Mild ai, mild mr    Other and unspecified hyperlipidemia     Tricuspid valve disorders, non-rheumatic     Mild tr     Tricuspid valve disorders, specified as nonrheumatic     Mild tr       Past Surgical History:   Procedure Laterality Date    HX TUBAL LIGATION         Social History     Tobacco Use    Smoking status: Never Smoker    Smokeless tobacco: Never Used   Substance Use Topics    Alcohol use: No       No Known Allergies    Current Outpatient Medications   Medication Sig    ELIQUIS 5 mg tablet TAKE 1 TABLET TWICE DAILY    baclofen (LIORESAL) 10 mg tablet Take  by mouth as needed.  levothyroxine (SYNTHROID) 50 mcg tablet Take  by mouth Daily (before breakfast).  cholecalciferol (VITAMIN D3) 1,000 unit tablet Take  by mouth daily.  b complex-vitamin c-folic acid 5mg (DIATX,DEXFOL) tab tablet Take 1 tablet by mouth daily.  losartan (COZAAR) 25 mg tablet Take  by mouth daily.  diltiazem hcl 360 mg Tb24 Take  by mouth.  pravastatin (PRAVACHOL) 40 mg tablet Take 40 mg by mouth nightly.  gemfibrozil (LOPID) 600 mg tablet Take 600 mg by mouth two (2) times a day.  carvedilol (COREG) 12.5 mg tablet Take  by mouth two (2) times daily (with meals).  POLYETHYLENE GLYCOL 3350 (MIRALAX PO) Take  by mouth as needed.  Glucosamine Sulfate Sodium Cl 1,000 mg Tab Take  by mouth two (2) times a day. No current facility-administered medications for this visit. Visit Vitals  /78   Pulse (!) 48   Ht 5' 6\" (1.676 m)   Wt 80.7 kg (178 lb)   BMI 28.73 kg/m²         Physical Exam   Constitutional: She is oriented to person, place, and time. She appears well-developed and well-nourished. HENT:   Head: Normocephalic and atraumatic. Eyes: Conjunctivae are normal.   Neck: Neck supple. No JVD present. No tracheal deviation present. No thyromegaly present. Cardiovascular: Normal rate and regular rhythm. PMI is not displaced.  Exam reveals no gallop and no decreased pulses. Murmur heard. Early systolic murmur is present at the upper right sternal border. Pulmonary/Chest: No respiratory distress. She has no wheezes. She has no rales. She exhibits no tenderness. Abdominal: Soft. There is no tenderness. Musculoskeletal: She exhibits no edema. Neurological: She is alert and oriented to person, place, and time. Skin: Skin is warm. Psychiatric: She has a normal mood and affect. Ms. Daly Kovacs has a reminder for a \"due or due soon\" health maintenance. I have asked that she contact her primary care provider for follow-up on this health maintenance. No flowsheet data found. SUMMARY:3/2018  Left ventricle: Systolic function was normal. Ejection fraction was  estimated in the range of 55 % to 60 %. There were no regional wall motion  abnormalities. Doppler parameters were consistent with abnormal left  ventricular relaxation (grade 1 diastolic dysfunction). Mitral valve: There was mild annular calcification. There was mild  regurgitation. Tricuspid valve: There was moderate to severe regurgitation. Pulmonic valve: There was mild regurgitation. I Have personally reviewed recent relevant labs available and discussed with patient    Assessment       ICD-10-CM ICD-9-CM    1. Paroxysmal atrial fibrillation (HCC) I48.0 427.31     Stable occasional palpitation continue rate control and anticoagulation   2. Essential hypertension, benign I10 401.1     Stable on treatment   3. Hypercholesterolemia E78.00 272.0     Continue treatment lab with PCP   4. Mitral valve insufficiency and aortic valve insufficiency I08.0 396.3     Stable monitor   5. Tricuspid valve disorders, non-rheumatic I36.9 424.2     Moderate to severe monitor   6. Current use of long term anticoagulation Z79.01 V58.61     For A. fib   3/2018-recurrent a fib  Start eliquis and stop asa  ?  Rate control v/s rhythm control -decide at next visit    4/2018  Decided to continue rate control and anticoagulation  Medications Discontinued During This Encounter   Medication Reason    vitamin E (AQUA GEMS) 400 unit capsule Not A Current Medication    multivitamin (ONE A DAY) tablet Not A Current Medication    magnesium oxide 250 mg tablet Not A Current Medication       No orders of the defined types were placed in this encounter. Follow-up and Dispositions    · Return in about 6 months (around 4/24/2020).

## 2020-06-09 ENCOUNTER — OFFICE VISIT (OUTPATIENT)
Dept: CARDIOLOGY CLINIC | Age: 85
End: 2020-06-09

## 2020-06-09 VITALS
DIASTOLIC BLOOD PRESSURE: 87 MMHG | BODY MASS INDEX: 29.09 KG/M2 | WEIGHT: 181 LBS | HEIGHT: 66 IN | TEMPERATURE: 96.1 F | HEART RATE: 52 BPM | SYSTOLIC BLOOD PRESSURE: 157 MMHG

## 2020-06-09 DIAGNOSIS — I08.0 MITRAL VALVE INSUFFICIENCY AND AORTIC VALVE INSUFFICIENCY: ICD-10-CM

## 2020-06-09 DIAGNOSIS — I48.0 PAROXYSMAL ATRIAL FIBRILLATION (HCC): Primary | ICD-10-CM

## 2020-06-09 DIAGNOSIS — E78.00 HYPERCHOLESTEROLEMIA: ICD-10-CM

## 2020-06-09 DIAGNOSIS — I10 ESSENTIAL HYPERTENSION, BENIGN: ICD-10-CM

## 2020-06-09 DIAGNOSIS — Z79.01 CURRENT USE OF LONG TERM ANTICOAGULATION: ICD-10-CM

## 2020-06-09 NOTE — PROGRESS NOTES
HISTORY OF PRESENT ILLNESS  Law Green is a 82 y.o. female. Patient with A fib,htn,mr,ai,tr. On follow up patient denies any chest pains,sob, palpitation or other significant symptoms. Palpitations    The history is provided by the patient. This is a recurrent problem. The problem has not changed since onset. The problem occurs constantly. The problem is associated with nothing. Pertinent negatives include no fever, no chest pain, no claudication, no orthopnea, no PND, no abdominal pain, no nausea, no vomiting, no headaches, no dizziness, no weakness, no cough, no hemoptysis, no shortness of breath and no sputum production. Her past medical history is significant for hypertension. Valvular Heart Disease   The history is provided by the patient. This is a chronic problem. The problem occurs constantly. The problem has not changed since onset. Pertinent negatives include no chest pain, no abdominal pain, no headaches and no shortness of breath. Hypertension   Pertinent negatives include no chest pain, no abdominal pain, no headaches and no shortness of breath. Cholesterol Problem   Pertinent negatives include no chest pain, no abdominal pain, no headaches and no shortness of breath. Review of Systems   Constitutional: Negative for chills and fever. HENT: Negative for nosebleeds. Eyes: Negative for blurred vision and double vision. Respiratory: Negative for cough, hemoptysis, sputum production, shortness of breath and wheezing. Cardiovascular: Positive for palpitations. Negative for chest pain, orthopnea, claudication, leg swelling and PND. Gastrointestinal: Negative for abdominal pain, heartburn, nausea and vomiting. Musculoskeletal: Negative for myalgias. Skin: Negative for rash. Neurological: Negative for dizziness, weakness and headaches. Endo/Heme/Allergies: Does not bruise/bleed easily.      Family History   Problem Relation Age of Onset    Heart Disease Neg Hx         No family history of heart disease       Past Medical History:   Diagnosis Date    Atrial fibrillation (HCC)     Back in sr, will continue pradaxa for 4 weeks, holter in 3 week and idecide on stopping pradaxa    Essential hypertension, benign     Mildly elevated    Hypercholesterolemia     Mitral valve insufficiency and aortic valve insufficiency     Mild ai, mild mr    Other and unspecified hyperlipidemia     Tricuspid valve disorders, non-rheumatic     Mild tr     Tricuspid valve disorders, specified as nonrheumatic     Mild tr       Past Surgical History:   Procedure Laterality Date    HX TUBAL LIGATION         Social History     Tobacco Use    Smoking status: Never Smoker    Smokeless tobacco: Never Used   Substance Use Topics    Alcohol use: No       No Known Allergies    Current Outpatient Medications   Medication Sig    ELIQUIS 5 mg tablet TAKE 1 TABLET TWICE DAILY    baclofen (LIORESAL) 10 mg tablet Take  by mouth as needed.  levothyroxine (SYNTHROID) 50 mcg tablet Take  by mouth Daily (before breakfast).  cholecalciferol (VITAMIN D3) 1,000 unit tablet Take  by mouth daily.  b complex-vitamin c-folic acid 5mg (DIATX,DEXFOL) tab tablet Take 1 tablet by mouth daily.  losartan (COZAAR) 25 mg tablet Take  by mouth daily.  diltiazem hcl 360 mg Tb24 Take  by mouth.  pravastatin (PRAVACHOL) 40 mg tablet Take 40 mg by mouth nightly.  gemfibrozil (LOPID) 600 mg tablet Take 600 mg by mouth two (2) times a day.  carvedilol (COREG) 12.5 mg tablet Take  by mouth two (2) times daily (with meals).  POLYETHYLENE GLYCOL 3350 (MIRALAX PO) Take  by mouth as needed.  Glucosamine Sulfate Sodium Cl 1,000 mg Tab Take  by mouth two (2) times a day. No current facility-administered medications for this visit.         Visit Vitals  /87   Pulse (!) 52   Temp (!) 96.1 °F (35.6 °C)   Ht 5' 6\" (1.676 m)   Wt 82.1 kg (181 lb)   BMI 29.21 kg/m²         Physical Exam   Constitutional: She is oriented to person, place, and time. She appears well-developed and well-nourished. HENT:   Head: Normocephalic and atraumatic. Eyes: Conjunctivae are normal.   Neck: Neck supple. No JVD present. No tracheal deviation present. No thyromegaly present. Cardiovascular: Normal rate and regular rhythm. PMI is not displaced. Exam reveals no gallop and no decreased pulses. Murmur heard. Early systolic murmur is present at the upper right sternal border. Pulmonary/Chest: No respiratory distress. She has no wheezes. She has no rales. She exhibits no tenderness. Abdominal: Soft. There is no abdominal tenderness. Musculoskeletal:         General: No edema. Neurological: She is alert and oriented to person, place, and time. Skin: Skin is warm. Psychiatric: She has a normal mood and affect. Ms. Vidya Daley has a reminder for a \"due or due soon\" health maintenance. I have asked that she contact her primary care provider for follow-up on this health maintenance. No flowsheet data found. SUMMARY:3/2018  Left ventricle: Systolic function was normal. Ejection fraction was  estimated in the range of 55 % to 60 %. There were no regional wall motion  abnormalities. Doppler parameters were consistent with abnormal left  ventricular relaxation (grade 1 diastolic dysfunction). Mitral valve: There was mild annular calcification. There was mild  regurgitation. Tricuspid valve: There was moderate to severe regurgitation. Pulmonic valve: There was mild regurgitation. I Have personally reviewed recent relevant labs available and discussed with patient    Assessment       ICD-10-CM ICD-9-CM    1. Paroxysmal atrial fibrillation (HCC) I48.0 427.31     Stable continue current treatment. Maintain on anticoagulation   2. Essential hypertension, benign I10 401.1     Stable continue treatment   3. Hypercholesterolemia E78.00 272.0     Continue treatment lab with PCP   4.  Mitral valve insufficiency and aortic valve insufficiency I08.0 396.3     Stable monitor   5. Current use of long term anticoagulation Z79.01 V58.61     For A. fib   3/2018-recurrent a fib  Start eliquis and stop asa  ? Rate control v/s rhythm control -decide at next visit    4/2018  Decided to continue rate control and anticoagulation    6/2020  Cardiac status stable. Continue with rate control and anticoagulation      There are no discontinued medications. No orders of the defined types were placed in this encounter. Follow-up and Dispositions    · Return in about 6 months (around 12/9/2020).

## 2020-06-09 NOTE — PATIENT INSTRUCTIONS
Widbook Activation Thank you for requesting access to Widbook. Please follow the instructions below to securely access and download your online medical record. Widbook allows you to send messages to your doctor, view your test results, renew your prescriptions, schedule appointments, and more. How Do I Sign Up? 1. In your internet browser, go to [email or web address]. 2. Click on the First Time User? Click Here link in the Sign In box. You will see the New Member Sign Up page. 3. Enter your Widbook Access Code exactly as it appears below. You will not need to use this code after youve completed the sign-up process. If you do not sign up before the expiration date, you must request a new code. Widbook Access Code: [email or web address] Expires: 2020  1:17 PM (This is the date your Widbook access code will ) 4. Enter the last four digits of your Social Security Number (xxxx) and Date of Birth (mm/dd/yyyy) as indicated and click Submit. You will be taken to the next sign-up page. 5. Create a Widbook ID. This will be your Widbook login ID and cannot be changed, so think of one that is secure and easy to remember. 6. Create a Widbook password. You can change your password at any time. 7. Enter your Password Reset Question and Answer. This can be used at a later time if you forget your password. 8. Enter your e-mail address. You will receive e-mail notification when new information is available in [address] 9. Click Sign Up. You can now view and download portions of your medical record. 10. Click the Download Summary menu link to download a portable copy of your medical information. Additional Information If you have questions, please visit the Frequently Asked Questions section of the Widbook website at [email or web address] Remember, Widbook is NOT to be used for urgent needs. For medical emergencies, dial 911.

## 2020-06-09 NOTE — PROGRESS NOTES
1. Have you been to the ER, urgent care clinic since your last visit? Hospitalized since your last visit?     no  2. Have you seen or consulted any other health care providers outside of the [address] [place] [address] since your last visit? Include any pap smears or colon screening. No     3. Since your last visit, have you had any of the following symptoms? shortness of breath.

## 2020-06-24 DIAGNOSIS — I48.0 PAROXYSMAL ATRIAL FIBRILLATION (HCC): Primary | ICD-10-CM

## 2020-06-24 NOTE — TELEPHONE ENCOUNTER
Requested Prescriptions     Pending Prescriptions Disp Refills    apixaban (Eliquis) 5 mg tablet 180 Tab 3     Sig: Take 1 Tab by mouth two (2) times a day.

## 2020-11-02 ENCOUNTER — TELEPHONE (OUTPATIENT)
Dept: CARDIOLOGY CLINIC | Age: 85
End: 2020-11-02

## 2020-11-02 NOTE — TELEPHONE ENCOUNTER
Called and spoke to patient regarding Eliquis samples, Well call patient when the drug rep are available to bring in samples. She voices understanding and acceptance of this advice and will call back if any further questions or concerns.

## 2020-12-03 ENCOUNTER — OFFICE VISIT (OUTPATIENT)
Dept: CARDIOLOGY CLINIC | Age: 85
End: 2020-12-03
Payer: MEDICARE

## 2020-12-03 VITALS
HEIGHT: 66 IN | HEART RATE: 52 BPM | DIASTOLIC BLOOD PRESSURE: 72 MMHG | TEMPERATURE: 97.9 F | BODY MASS INDEX: 28.77 KG/M2 | WEIGHT: 179 LBS | SYSTOLIC BLOOD PRESSURE: 154 MMHG

## 2020-12-03 DIAGNOSIS — I48.0 PAROXYSMAL ATRIAL FIBRILLATION (HCC): Primary | ICD-10-CM

## 2020-12-03 DIAGNOSIS — I10 ESSENTIAL HYPERTENSION, BENIGN: ICD-10-CM

## 2020-12-03 DIAGNOSIS — E78.00 HYPERCHOLESTEROLEMIA: ICD-10-CM

## 2020-12-03 DIAGNOSIS — I08.0 MITRAL VALVE INSUFFICIENCY AND AORTIC VALVE INSUFFICIENCY: ICD-10-CM

## 2020-12-03 DIAGNOSIS — Z79.01 CURRENT USE OF LONG TERM ANTICOAGULATION: ICD-10-CM

## 2020-12-03 PROCEDURE — G8754 DIAS BP LESS 90: HCPCS | Performed by: INTERNAL MEDICINE

## 2020-12-03 PROCEDURE — 99214 OFFICE O/P EST MOD 30 MIN: CPT | Performed by: INTERNAL MEDICINE

## 2020-12-03 PROCEDURE — G8753 SYS BP > OR = 140: HCPCS | Performed by: INTERNAL MEDICINE

## 2020-12-03 PROCEDURE — G8419 CALC BMI OUT NRM PARAM NOF/U: HCPCS | Performed by: INTERNAL MEDICINE

## 2020-12-03 PROCEDURE — 1101F PT FALLS ASSESS-DOCD LE1/YR: CPT | Performed by: INTERNAL MEDICINE

## 2020-12-03 PROCEDURE — G8510 SCR DEP NEG, NO PLAN REQD: HCPCS | Performed by: INTERNAL MEDICINE

## 2020-12-03 PROCEDURE — 1090F PRES/ABSN URINE INCON ASSESS: CPT | Performed by: INTERNAL MEDICINE

## 2020-12-03 PROCEDURE — G8536 NO DOC ELDER MAL SCRN: HCPCS | Performed by: INTERNAL MEDICINE

## 2020-12-03 PROCEDURE — G8400 PT W/DXA NO RESULTS DOC: HCPCS | Performed by: INTERNAL MEDICINE

## 2020-12-03 PROCEDURE — G8427 DOCREV CUR MEDS BY ELIG CLIN: HCPCS | Performed by: INTERNAL MEDICINE

## 2020-12-03 NOTE — PROGRESS NOTES
1. Have you been to the ER, urgent care clinic since your last visit? Hospitalized since your last visit?     no  2. Have you seen or consulted any other health care providers outside of the [address] [place] [address] since your last visit? Include any pap smears or colon screening. No     3. Since your last visit, have you had any of the following symptoms?      swelling in legs/arms.

## 2020-12-03 NOTE — PROGRESS NOTES
HISTORY OF PRESENT ILLNESS  Morrie Nyhan is a 82 y.o. female. Patient with A fib,htn,mr,ai,tr. On follow up patient denies any chest pains,sob, palpitation or other significant symptoms. 12/2020  Patient seen today for follow-up. Denies any chest pain or shortness of breath. Few months ago patient was feeling sluggish at that time after evaluation her statins were stopped. Patient feels better at present. Palpitations    The history is provided by the patient. This is a recurrent problem. The problem has not changed since onset. The problem occurs constantly. The problem is associated with nothing. Pertinent negatives include no fever, no chest pain, no claudication, no orthopnea, no PND, no abdominal pain, no nausea, no vomiting, no headaches, no dizziness, no weakness, no cough, no hemoptysis, no shortness of breath and no sputum production. Her past medical history is significant for hypertension. Hypertension   Pertinent negatives include no chest pain, no abdominal pain, no headaches and no shortness of breath. Cholesterol Problem   Pertinent negatives include no chest pain, no abdominal pain, no headaches and no shortness of breath. Valvular Heart Disease   The history is provided by the patient. This is a chronic problem. The problem occurs constantly. The problem has not changed since onset. Pertinent negatives include no chest pain, no abdominal pain, no headaches and no shortness of breath. Review of Systems   Constitutional: Negative for chills and fever. HENT: Negative for nosebleeds. Eyes: Negative for blurred vision and double vision. Respiratory: Negative for cough, hemoptysis, sputum production, shortness of breath and wheezing. Cardiovascular: Positive for palpitations. Negative for chest pain, orthopnea, claudication, leg swelling and PND. Gastrointestinal: Negative for abdominal pain, heartburn, nausea and vomiting. Musculoskeletal: Negative for myalgias.    Skin: Negative for rash. Neurological: Negative for dizziness, weakness and headaches. Endo/Heme/Allergies: Does not bruise/bleed easily. Family History   Problem Relation Age of Onset    Heart Disease Neg Hx         No family history of heart disease       Past Medical History:   Diagnosis Date    Atrial fibrillation ([address]     Back in sr, will continue pradaxa for 4 weeks, holter in 3 week and idecide on stopping pradaxa    Essential hypertension, benign     Mildly elevated    Hypercholesterolemia     Mitral valve insufficiency and aortic valve insufficiency     Mild ai, mild mr    Other and unspecified hyperlipidemia     Tricuspid valve disorders, non-rheumatic     Mild tr     Tricuspid valve disorders, specified as nonrheumatic     Mild tr       Past Surgical History:   Procedure Laterality Date    HX TUBAL LIGATION         Social History     Tobacco Use    Smoking status: Never Smoker    Smokeless tobacco: Never Used   Substance Use Topics    Alcohol use: No       No Known Allergies    Current Outpatient Medications   Medication Sig    apixaban (Eliquis) 5 mg tablet Take 1 Tab by mouth two (2) times a day.  baclofen (LIORESAL) 10 mg tablet Take  by mouth as needed.  levothyroxine (SYNTHROID) 50 mcg tablet Take  by mouth Daily (before breakfast).  cholecalciferol (VITAMIN D3) 1,000 unit tablet Take  by mouth daily.  b complex-vitamin c-folic acid 5mg (DIATX,DEXFOL) tab tablet Take 1 tablet by mouth daily.  losartan (COZAAR) 25 mg tablet Take  by mouth daily.  diltiazem hcl 360 mg Tb24 Take  by mouth.  carvedilol (COREG) 12.5 mg tablet Take  by mouth two (2) times daily (with meals).  POLYETHYLENE GLYCOL 3350 (MIRALAX PO) Take  by mouth as needed.  Glucosamine Sulfate Sodium Cl 1,000 mg Tab Take  by mouth two (2) times a day. No current facility-administered medications for this visit.         Visit Vitals  BP (!) 154/72   Pulse (!) 52   Temp 97.9 °F (36.6 °C) (Temporal)   Ht 5' 6\" (1.676 m)   Wt 81.2 kg (179 lb)   BMI 28.89 kg/m²         Physical Exam   Constitutional: She is oriented to person, place, and time. She appears well-developed and well-nourished. HENT:   Head: Normocephalic and atraumatic. Eyes: Conjunctivae are normal.   Neck: Neck supple. No JVD present. No tracheal deviation present. No thyromegaly present. Cardiovascular: Normal rate and regular rhythm. PMI is not displaced. Exam reveals no gallop and no decreased pulses. Murmur heard. Early systolic murmur is present at the upper right sternal border. Pulmonary/Chest: No respiratory distress. She has no wheezes. She has no rales. She exhibits no tenderness. Abdominal: Soft. There is no abdominal tenderness. Musculoskeletal:         General: No edema. Neurological: She is alert and oriented to person, place, and time. Skin: Skin is warm. Psychiatric: She has a normal mood and affect. Ms. Brittanie Schuster has a reminder for a \"due or due soon\" health maintenance. I have asked that she contact her primary care provider for follow-up on this health maintenance. No flowsheet data found. SUMMARY:3/2018  Left ventricle: Systolic function was normal. Ejection fraction was  estimated in the range of 55 % to 60 %. There were no regional wall motion  abnormalities. Doppler parameters were consistent with abnormal left  ventricular relaxation (grade 1 diastolic dysfunction). Mitral valve: There was mild annular calcification. There was mild  regurgitation. Tricuspid valve: There was moderate to severe regurgitation. Pulmonic valve: There was mild regurgitation.     I Have personally reviewed recent relevant labs available and discussed with patient  Care Everywhere Result Report  LIPID COMPLETE PANELResulted: 8/6/2020 5:37 PM  [address]  Component Name Value Ref Range   Cholesterol 148 110 - 200 mg/dL   Triglyceride 83 40 - 149 mg/dL   HDL 37 (L) >=40 mg/dL   Cholesterol/HDL 4.0 0.0 - 5.0    Non-HDL Cholesterol 111 <130 mg/dL   LDL CALCULATION 94 50 - 99 mg/dL   VLDL CALCULATION 17 8 - 30 mg/dL   LDL/HDL Ratio 2.5     I Have personally reviewed recent relevant labs available and discussed with patient  8/2020  Lipid, CBC, BMP, TSH    Assessment       ICD-10-CM ICD-9-CM    1. Paroxysmal atrial fibrillation (HCC)  I48.0 427.31     Stable continue treatment maintain on anticoagulation   2. Essential hypertension, benign  I10 401.1     Mildly elevated. Monitor   3. Hypercholesterolemia  E78.00 272.0     Currently off statin due to feeling sluggish. Feels better after stopping it. We will continue to monitor clinically   4. Mitral valve insufficiency and aortic valve insufficiency  I08.0 396.3     Stable monitor   5. Current use of long term anticoagulation  Z79.01 V58.61     For A. fib   3/2018-recurrent a fib  Start eliquis and stop asa  ? Rate control v/s rhythm control -decide at next visit    4/2018  Decided to continue rate control and anticoagulation    6/2020  Cardiac status stable. Continue with rate control and anticoagulation  12/2020  Cardiac status stable. Mildly elevated blood pressure continue to monitor. Off statin and gemfibrozil due to feeling sluggish. She has felt better after stopping that. Monitor clinically    Medications Discontinued During This Encounter   Medication Reason    gemfibrozil (LOPID) 600 mg tablet DISCONTINUED BY ANOTHER CLINICIAN    pravastatin (PRAVACHOL) 40 mg tablet DISCONTINUED BY ANOTHER CLINICIAN       No orders of the defined types were placed in this encounter. Follow-up and Dispositions    · Return in about 6 months (around 6/3/2021).

## 2021-02-16 ENCOUNTER — OFFICE VISIT (OUTPATIENT)
Dept: CARDIOLOGY CLINIC | Age: 86
End: 2021-02-16
Payer: MEDICARE

## 2021-02-16 VITALS
DIASTOLIC BLOOD PRESSURE: 73 MMHG | HEIGHT: 66 IN | WEIGHT: 176 LBS | HEART RATE: 63 BPM | SYSTOLIC BLOOD PRESSURE: 124 MMHG | TEMPERATURE: 97.2 F | BODY MASS INDEX: 28.28 KG/M2

## 2021-02-16 DIAGNOSIS — Z79.01 CURRENT USE OF LONG TERM ANTICOAGULATION: ICD-10-CM

## 2021-02-16 DIAGNOSIS — I48.19 PERSISTENT ATRIAL FIBRILLATION (HCC): Primary | ICD-10-CM

## 2021-02-16 DIAGNOSIS — I08.0 MITRAL VALVE INSUFFICIENCY AND AORTIC VALVE INSUFFICIENCY: ICD-10-CM

## 2021-02-16 DIAGNOSIS — I10 ESSENTIAL HYPERTENSION, BENIGN: ICD-10-CM

## 2021-02-16 DIAGNOSIS — E78.00 HYPERCHOLESTEROLEMIA: ICD-10-CM

## 2021-02-16 PROCEDURE — G8432 DEP SCR NOT DOC, RNG: HCPCS | Performed by: INTERNAL MEDICINE

## 2021-02-16 PROCEDURE — G8536 NO DOC ELDER MAL SCRN: HCPCS | Performed by: INTERNAL MEDICINE

## 2021-02-16 PROCEDURE — G8400 PT W/DXA NO RESULTS DOC: HCPCS | Performed by: INTERNAL MEDICINE

## 2021-02-16 PROCEDURE — G8419 CALC BMI OUT NRM PARAM NOF/U: HCPCS | Performed by: INTERNAL MEDICINE

## 2021-02-16 PROCEDURE — G8752 SYS BP LESS 140: HCPCS | Performed by: INTERNAL MEDICINE

## 2021-02-16 PROCEDURE — G8754 DIAS BP LESS 90: HCPCS | Performed by: INTERNAL MEDICINE

## 2021-02-16 PROCEDURE — 1101F PT FALLS ASSESS-DOCD LE1/YR: CPT | Performed by: INTERNAL MEDICINE

## 2021-02-16 PROCEDURE — G8427 DOCREV CUR MEDS BY ELIG CLIN: HCPCS | Performed by: INTERNAL MEDICINE

## 2021-02-16 PROCEDURE — 99214 OFFICE O/P EST MOD 30 MIN: CPT | Performed by: INTERNAL MEDICINE

## 2021-02-16 PROCEDURE — 1090F PRES/ABSN URINE INCON ASSESS: CPT | Performed by: INTERNAL MEDICINE

## 2021-02-16 RX ORDER — PRAVASTATIN SODIUM 40 MG/1
40 TABLET ORAL
COMMUNITY

## 2021-02-16 RX ORDER — AMLODIPINE BESYLATE 10 MG/1
TABLET ORAL DAILY
COMMUNITY
End: 2021-06-03

## 2021-02-16 RX ORDER — TRIAMCINOLONE ACETONIDE 1 MG/G
CREAM TOPICAL 2 TIMES DAILY
COMMUNITY

## 2021-02-16 RX ORDER — HYDROCHLOROTHIAZIDE 12.5 MG/1
12.5 TABLET ORAL DAILY
COMMUNITY
End: 2021-06-03 | Stop reason: SDUPTHER

## 2021-02-16 NOTE — PROGRESS NOTES
1. Have you been to the ER, urgent care clinic since your last visit? Hospitalized since your last visit? Yes [facility]  2. Have you seen or consulted any other health care providers outside of the [address] [place] [address] since your last visit? Include any pap smears or colon screening.       No

## 2021-06-03 ENCOUNTER — OFFICE VISIT (OUTPATIENT)
Dept: CARDIOLOGY CLINIC | Age: 86
End: 2021-06-03
Payer: MEDICARE

## 2021-06-03 VITALS
WEIGHT: 176 LBS | HEIGHT: 66 IN | HEART RATE: 70 BPM | SYSTOLIC BLOOD PRESSURE: 129 MMHG | DIASTOLIC BLOOD PRESSURE: 65 MMHG | BODY MASS INDEX: 28.28 KG/M2 | OXYGEN SATURATION: 98 %

## 2021-06-03 DIAGNOSIS — I48.19 PERSISTENT ATRIAL FIBRILLATION (HCC): Primary | ICD-10-CM

## 2021-06-03 DIAGNOSIS — Z79.01 CURRENT USE OF LONG TERM ANTICOAGULATION: ICD-10-CM

## 2021-06-03 DIAGNOSIS — I08.0 MITRAL VALVE INSUFFICIENCY AND AORTIC VALVE INSUFFICIENCY: ICD-10-CM

## 2021-06-03 DIAGNOSIS — E78.00 HYPERCHOLESTEROLEMIA: ICD-10-CM

## 2021-06-03 DIAGNOSIS — I10 ESSENTIAL HYPERTENSION, BENIGN: ICD-10-CM

## 2021-06-03 PROCEDURE — G8427 DOCREV CUR MEDS BY ELIG CLIN: HCPCS | Performed by: INTERNAL MEDICINE

## 2021-06-03 PROCEDURE — G8432 DEP SCR NOT DOC, RNG: HCPCS | Performed by: INTERNAL MEDICINE

## 2021-06-03 PROCEDURE — 1101F PT FALLS ASSESS-DOCD LE1/YR: CPT | Performed by: INTERNAL MEDICINE

## 2021-06-03 PROCEDURE — G8536 NO DOC ELDER MAL SCRN: HCPCS | Performed by: INTERNAL MEDICINE

## 2021-06-03 PROCEDURE — G8754 DIAS BP LESS 90: HCPCS | Performed by: INTERNAL MEDICINE

## 2021-06-03 PROCEDURE — 99214 OFFICE O/P EST MOD 30 MIN: CPT | Performed by: INTERNAL MEDICINE

## 2021-06-03 PROCEDURE — G8752 SYS BP LESS 140: HCPCS | Performed by: INTERNAL MEDICINE

## 2021-06-03 PROCEDURE — G8419 CALC BMI OUT NRM PARAM NOF/U: HCPCS | Performed by: INTERNAL MEDICINE

## 2021-06-03 PROCEDURE — 1090F PRES/ABSN URINE INCON ASSESS: CPT | Performed by: INTERNAL MEDICINE

## 2021-06-03 PROCEDURE — G8400 PT W/DXA NO RESULTS DOC: HCPCS | Performed by: INTERNAL MEDICINE

## 2021-06-03 RX ORDER — AMLODIPINE BESYLATE 5 MG/1
5 TABLET ORAL DAILY
Qty: 90 TABLET | Refills: 3 | Status: SHIPPED | OUTPATIENT
Start: 2021-06-03 | End: 2022-03-16

## 2021-06-03 RX ORDER — HYDROCHLOROTHIAZIDE 12.5 MG/1
12.5 TABLET ORAL DAILY
Qty: 90 TABLET | Refills: 3 | Status: SHIPPED | OUTPATIENT
Start: 2021-06-03 | End: 2021-06-07 | Stop reason: SDUPTHER

## 2021-06-03 NOTE — PROGRESS NOTES
1. Have you been to the ER, urgent care clinic since your last visit? Hospitalized since your last visit? No    2. Have you seen or consulted any other health care providers outside of the [address] [place] [address] since your last visit? Include any pap smears or colon screening. No        3. Do you need any refills today?    No

## 2021-06-03 NOTE — PROGRESS NOTES
HISTORY OF PRESENT ILLNESS  John Quiros is a 82 y.o. female. Patient with A fib,htn,mr,ai,tr. On follow up patient denies any chest pains,sob, palpitation or other significant symptoms. 12/2020  Patient seen today for follow-up. Denies any chest pain or shortness of breath. Few months ago patient was feeling sluggish at that time after evaluation her statins were stopped. Patient feels better at present. 2/2021  Patient is here for follow-up she was admitted recently in hospital with atrial fibrillation with slow ventricular rate in 30s. AV danilela blocking agent were discontinued in form of Coreg and Cardizem. Since then she has been doing better. Denies any chest pain palpitation shortness of breath or other symptom at present  6/2020  Patient seen today for follow-up. Has been noticing swelling on feet for about 2 weeks    Palpitations   The history is provided by the patient. This is a recurrent problem. The problem has not changed since onset. The problem occurs constantly. The problem is associated with nothing. Pertinent negatives include no fever, no chest pain, no claudication, no orthopnea, no PND, no abdominal pain, no nausea, no vomiting, no headaches, no dizziness, no weakness, no cough, no hemoptysis, no shortness of breath and no sputum production. Her past medical history is significant for hypertension. Hypertension  Pertinent negatives include no chest pain, no abdominal pain, no headaches and no shortness of breath. Cholesterol Problem  Pertinent negatives include no chest pain, no abdominal pain, no headaches and no shortness of breath. Valvular Heart Disease  The history is provided by the patient. This is a chronic problem. The problem occurs constantly. The problem has not changed since onset. Pertinent negatives include no chest pain, no abdominal pain, no headaches and no shortness of breath.    Follow-up  Pertinent negatives include no chest pain, no abdominal pain, no headaches and no shortness of breath. Review of Systems   Constitutional: Negative for chills and fever. HENT: Negative for nosebleeds. Eyes: Negative for blurred vision and double vision. Respiratory: Negative for cough, hemoptysis, sputum production, shortness of breath and wheezing. Cardiovascular: Positive for palpitations. Negative for chest pain, orthopnea, claudication, leg swelling and PND. Gastrointestinal: Negative for abdominal pain, heartburn, nausea and vomiting. Musculoskeletal: Negative for myalgias. Skin: Negative for rash. Neurological: Negative for dizziness, weakness and headaches. Endo/Heme/Allergies: Does not bruise/bleed easily. Family History   Problem Relation Age of Onset    Heart Disease Neg Hx         No family history of heart disease       Past Medical History:   Diagnosis Date    Atrial fibrillation ([address]     Back in sr, will continue pradaxa for 4 weeks, holter in 3 week and idecide on stopping pradaxa    Essential hypertension, benign     Mildly elevated    Hypercholesterolemia     Mitral valve insufficiency and aortic valve insufficiency     Mild ai, mild mr    Other and unspecified hyperlipidemia     Tricuspid valve disorders, non-rheumatic     Mild tr     Tricuspid valve disorders, specified as nonrheumatic     Mild tr       Past Surgical History:   Procedure Laterality Date    HX TUBAL LIGATION         Social History     Tobacco Use    Smoking status: Never Smoker    Smokeless tobacco: Never Used   Substance Use Topics    Alcohol use: No       No Known Allergies    Current Outpatient Medications   Medication Sig    amLODIPine (NORVASC) 5 mg tablet Take 1 Tablet by mouth daily.  hydroCHLOROthiazide (HYDRODIURIL) 12.5 mg tablet Take 1 Tablet by mouth daily. 1/2 tab daily    pravastatin (PRAVACHOL) 40 mg tablet Take 40 mg by mouth nightly.  triamcinolone acetonide (KENALOG) 0.1 % topical cream Apply  to affected area two (2) times a day.  use thin layer    apixaban (Eliquis) 5 mg tablet Take 1 Tab by mouth two (2) times a day.  baclofen (LIORESAL) 10 mg tablet Take  by mouth as needed.  levothyroxine (SYNTHROID) 50 mcg tablet Take  by mouth Daily (before breakfast).  cholecalciferol (VITAMIN D3) 1,000 unit tablet Take  by mouth daily.  losartan (COZAAR) 100 mg tablet Take  by mouth daily.  POLYETHYLENE GLYCOL 3350 (MIRALAX PO) Take  by mouth as needed.  Glucosamine Sulfate Sodium Cl 1,000 mg Tab Take  by mouth two (2) times a day. No current facility-administered medications for this visit. Visit Vitals  /65 (BP 1 Location: Left upper arm, BP Patient Position: Sitting, BP Cuff Size: Large adult)   Pulse 70   Ht 5' 6\" (1.676 m)   Wt 79.8 kg (176 lb)   SpO2 98%   BMI 28.41 kg/m²         Physical Exam  Constitutional:       Appearance: She is well-developed. HENT:      Head: Normocephalic and atraumatic. Eyes:      Conjunctiva/sclera: Conjunctivae normal.   Neck:      Thyroid: No thyromegaly. Vascular: No JVD. Trachea: No tracheal deviation. Cardiovascular:      Rate and Rhythm: Normal rate and regular rhythm. Chest Wall: PMI is not displaced. Pulses: No decreased pulses. Heart sounds: Murmur heard. Early systolic murmur is present at the upper right sternal border. No gallop. Pulmonary:      Effort: No respiratory distress. Breath sounds: No wheezing or rales. Chest:      Chest wall: No tenderness. Abdominal:      Palpations: Abdomen is soft. Tenderness: There is no abdominal tenderness. Musculoskeletal:      Cervical back: Neck supple. Skin:     General: Skin is warm. Neurological:      Mental Status: She is alert and oriented to person, place, and time. Ms. Warren Crandall has a reminder for a \"due or due soon\" health maintenance. I have asked that she contact her primary care provider for follow-up on this health maintenance.       No flowsheet data found.  SUMMARY:3/2018  Left ventricle: Systolic function was normal. Ejection fraction was  estimated in the range of 55 % to 60 %. There were no regional wall motion  abnormalities. Doppler parameters were consistent with abnormal left  ventricular relaxation (grade 1 diastolic dysfunction). Mitral valve: There was mild annular calcification. There was mild  regurgitation. Tricuspid valve: There was moderate to severe regurgitation. Pulmonic valve: There was mild regurgitation. I Have personally reviewed recent relevant labs available and discussed with patient  Care Everywhere Result Report  LIPID COMPLETE PANELResulted: 8/6/2020 5:37 PM  [address]  Component Name Value Ref Range   Cholesterol 148 110 - 200 mg/dL   Triglyceride 83 40 - 149 mg/dL   HDL 37 (L) >=40 mg/dL   Cholesterol/HDL 4.0 0.0 - 5.0    Non-HDL Cholesterol 111 <130 mg/dL   LDL CALCULATION 94 50 - 99 mg/dL   VLDL CALCULATION 17 8 - 30 mg/dL   LDL/HDL Ratio 2.5     I Have personally reviewed recent relevant labs available and discussed with patient  8/2020  Lipid, CBC, BMP, TSH  Echo Cardiogram Complete1/21/2021 [address]  Component Name Value Ref Range   EF Echo 65     Result Impression   :   NORMAL LEFT VENTRICULAR CAVITY SIZE AND SYSTOLIC FUNCTION WITH A BI-PLANE EJECTION FRACTION   OF  65 %.  BASAL SEPTAL HYPERTROPHY OF THE LEFT VENTRICLE. UNABLE TO ASSESS DIASTOLIC FUNCTION IN THIS PATIENT DUE TO IRREGULAR RHYTHM. DILATED LEFT ATRIUM. DILATED RIGHT ATRIUM. MITRAL ANNULAR CALCIFICATION WITH MILD MITRAL REGURGITATION. SCLEROTIC TRILEAFLET AORTIC VALVE WITH MILD AORTIC REGURGITATION. MODERATE TRICUSPID REGURGITATION WITH A RVSP OF 42 MMHG   TRACE PULMONIC REGURGITATION. NO EVIDENCE OF PERICARDIAL EFFUSION. NO MASSES, SHUNTS OR THROMBI SEEN. Assessment       ICD-10-CM ICD-9-CM    1. Persistent atrial fibrillation (HCC)  I48.19 427.31     Stable continue rate control monitor   2.  Essential hypertension, benign  I10 401.1     Stable continue therapy   3. Hypercholesterolemia  E78.00 272.0     Continue current treatment lab with PCP   4. Mitral valve insufficiency and aortic valve insufficiency  I08.0 396.3     Stable symptom monitor   5. Current use of long term anticoagulation  Z79.01 V58.61     For A. fib continue treatment   3/2018-recurrent a fib  Start eliquis and stop asa  ? Rate control v/s rhythm control -decide at next visit    4/2018  Decided to continue rate control and anticoagulation    6/2020  Cardiac status stable. Continue with rate control and anticoagulation  12/2020  Cardiac status stable. Mildly elevated blood pressure continue to monitor. Off statin and gemfibrozil due to feeling sluggish. She has felt better after stopping that. Monitor clinically  2/2021  Recent admission with A. fib with slow ventricular rate in 30s. Now off Coreg and Cardizem. Rate remains controlled with persistent A. fib. Continue rate control and anticoagulation. Blood pressure control on amlodipine and HCTZ  6/2021  Cardiac status stable. Rate controlled. Now on amlodipine but has noticed ankle swelling. Likely dependent edema from that. Will reduce amlodipine to 5 mg a day as blood pressure is controlled. Use extra dose of HCTZ as needed. Monitor    Medications Discontinued During This Encounter   Medication Reason    amLODIPine (NORVASC) 10 mg tablet     hydroCHLOROthiazide (HYDRODIURIL) 12.5 mg tablet REORDER       Orders Placed This Encounter    amLODIPine (NORVASC) 5 mg tablet     Sig: Take 1 Tablet by mouth daily. Dispense:  90 Tablet     Refill:  3    hydroCHLOROthiazide (HYDRODIURIL) 12.5 mg tablet     Sig: Take 1 Tablet by mouth daily. 1/2 tab daily     Dispense:  90 Tablet     Refill:  3       Follow-up and Dispositions    · Return in about 6 months (around 12/3/2021).

## 2021-06-07 RX ORDER — HYDROCHLOROTHIAZIDE 12.5 MG/1
12.5 TABLET ORAL DAILY
Qty: 90 TABLET | Refills: 3 | Status: SHIPPED | OUTPATIENT
Start: 2021-06-07 | End: 2021-06-09 | Stop reason: SDUPTHER

## 2021-06-07 NOTE — TELEPHONE ENCOUNTER
Per pharmacy there is a conflict with directions. Please clarify and send over appropriate directions.

## 2021-06-08 DIAGNOSIS — I48.0 PAROXYSMAL ATRIAL FIBRILLATION (HCC): ICD-10-CM

## 2021-06-08 RX ORDER — APIXABAN 5 MG/1
TABLET, FILM COATED ORAL
Qty: 180 TABLET | Refills: 3 | Status: SHIPPED | OUTPATIENT
Start: 2021-06-08 | End: 2022-01-13 | Stop reason: SDUPTHER

## 2021-06-09 RX ORDER — HYDROCHLOROTHIAZIDE 12.5 MG/1
12.5 TABLET ORAL DAILY
Qty: 90 TABLET | Refills: 3 | Status: SHIPPED | OUTPATIENT
Start: 2021-06-09 | End: 2022-03-16

## 2021-12-02 ENCOUNTER — OFFICE VISIT (OUTPATIENT)
Dept: CARDIOLOGY CLINIC | Age: 86
End: 2021-12-02
Payer: MEDICARE

## 2021-12-02 VITALS
BODY MASS INDEX: 28.35 KG/M2 | HEIGHT: 66 IN | WEIGHT: 176.4 LBS | HEART RATE: 65 BPM | DIASTOLIC BLOOD PRESSURE: 79 MMHG | SYSTOLIC BLOOD PRESSURE: 139 MMHG

## 2021-12-02 DIAGNOSIS — Z79.01 CURRENT USE OF LONG TERM ANTICOAGULATION: ICD-10-CM

## 2021-12-02 DIAGNOSIS — E78.00 HYPERCHOLESTEROLEMIA: ICD-10-CM

## 2021-12-02 DIAGNOSIS — I48.19 PERSISTENT ATRIAL FIBRILLATION (HCC): Primary | ICD-10-CM

## 2021-12-02 DIAGNOSIS — I10 ESSENTIAL HYPERTENSION, BENIGN: ICD-10-CM

## 2021-12-02 DIAGNOSIS — I08.0 MITRAL VALVE INSUFFICIENCY AND AORTIC VALVE INSUFFICIENCY: ICD-10-CM

## 2021-12-02 PROCEDURE — G8536 NO DOC ELDER MAL SCRN: HCPCS | Performed by: INTERNAL MEDICINE

## 2021-12-02 PROCEDURE — 1101F PT FALLS ASSESS-DOCD LE1/YR: CPT | Performed by: INTERNAL MEDICINE

## 2021-12-02 PROCEDURE — G8427 DOCREV CUR MEDS BY ELIG CLIN: HCPCS | Performed by: INTERNAL MEDICINE

## 2021-12-02 PROCEDURE — 1090F PRES/ABSN URINE INCON ASSESS: CPT | Performed by: INTERNAL MEDICINE

## 2021-12-02 PROCEDURE — G8419 CALC BMI OUT NRM PARAM NOF/U: HCPCS | Performed by: INTERNAL MEDICINE

## 2021-12-02 PROCEDURE — 99214 OFFICE O/P EST MOD 30 MIN: CPT | Performed by: INTERNAL MEDICINE

## 2021-12-02 PROCEDURE — G8754 DIAS BP LESS 90: HCPCS | Performed by: INTERNAL MEDICINE

## 2021-12-02 PROCEDURE — G8400 PT W/DXA NO RESULTS DOC: HCPCS | Performed by: INTERNAL MEDICINE

## 2021-12-02 PROCEDURE — G8752 SYS BP LESS 140: HCPCS | Performed by: INTERNAL MEDICINE

## 2021-12-02 PROCEDURE — G8510 SCR DEP NEG, NO PLAN REQD: HCPCS | Performed by: INTERNAL MEDICINE

## 2021-12-02 NOTE — PROGRESS NOTES
HISTORY OF PRESENT ILLNESS  Paty Hugo is a 82 y.o. female. Patient with A fib,htn,mr,ai,tr. On follow up patient denies any chest pains,sob, palpitation or other significant symptoms. 12/2020  Patient seen today for follow-up. Denies any chest pain or shortness of breath. Few months ago patient was feeling sluggish at that time after evaluation her statins were stopped. Patient feels better at present. 2/2021  Patient is here for follow-up she was admitted recently in hospital with atrial fibrillation with slow ventricular rate in 30s. AV daniella blocking agent were discontinued in form of Coreg and Cardizem. Since then she has been doing better. Denies any chest pain palpitation shortness of breath or other symptom at present  6/2020  Patient seen today for follow-up. Has been noticing swelling on feet for about 2 weeks    Follow-up  Pertinent negatives include no chest pain, no abdominal pain, no headaches and no shortness of breath. Palpitations   The history is provided by the patient. This is a recurrent problem. The problem has not changed since onset. The problem occurs constantly. The problem is associated with nothing. Pertinent negatives include no fever, no chest pain, no claudication, no orthopnea, no PND, no abdominal pain, no nausea, no vomiting, no headaches, no dizziness, no weakness, no cough, no hemoptysis, no shortness of breath and no sputum production. Her past medical history is significant for hypertension. Hypertension  Pertinent negatives include no chest pain, no abdominal pain, no headaches and no shortness of breath. Cholesterol Problem  Pertinent negatives include no chest pain, no abdominal pain, no headaches and no shortness of breath. Valvular Heart Disease  The history is provided by the patient. This is a chronic problem. The problem occurs constantly. The problem has not changed since onset. Pertinent negatives include no chest pain, no abdominal pain, no headaches and no shortness of breath. Review of Systems   Constitutional: Negative for chills and fever. HENT: Negative for nosebleeds. Eyes: Negative for blurred vision and double vision. Respiratory: Negative for cough, hemoptysis, sputum production, shortness of breath and wheezing. Cardiovascular: Positive for palpitations. Negative for chest pain, orthopnea, claudication, leg swelling and PND. Gastrointestinal: Negative for abdominal pain, heartburn, nausea and vomiting. Musculoskeletal: Negative for myalgias. Skin: Negative for rash. Neurological: Negative for dizziness, weakness and headaches. Endo/Heme/Allergies: Does not bruise/bleed easily. Family History   Problem Relation Age of Onset    Heart Disease Neg Hx         No family history of heart disease       Past Medical History:   Diagnosis Date    Atrial fibrillation ([address]     Back in sr, will continue pradaxa for 4 weeks, holter in 3 week and idecide on stopping pradaxa    Essential hypertension, benign     Mildly elevated    Hypercholesterolemia     Mitral valve insufficiency and aortic valve insufficiency     Mild ai, mild mr    Other and unspecified hyperlipidemia     Tricuspid valve disorders, non-rheumatic     Mild tr     Tricuspid valve disorders, specified as nonrheumatic     Mild tr       Past Surgical History:   Procedure Laterality Date    HX TUBAL LIGATION         Social History     Tobacco Use    Smoking status: Never Smoker    Smokeless tobacco: Never Used   Substance Use Topics    Alcohol use: No       No Known Allergies    Current Outpatient Medications   Medication Sig    hydroCHLOROthiazide (HYDRODIURIL) 12.5 mg tablet Take 1 Tablet by mouth daily.  Eliquis 5 mg tablet TAKE 1 TABLET TWICE DAILY    amLODIPine (NORVASC) 5 mg tablet Take 1 Tablet by mouth daily.  pravastatin (PRAVACHOL) 40 mg tablet Take 40 mg by mouth nightly.     triamcinolone acetonide (KENALOG) 0.1 % topical cream Apply  to affected area two (2) times a day. use thin layer    baclofen (LIORESAL) 10 mg tablet Take  by mouth as needed.  levothyroxine (SYNTHROID) 50 mcg tablet Take  by mouth Daily (before breakfast).  cholecalciferol (VITAMIN D3) 1,000 unit tablet Take  by mouth daily.  losartan (COZAAR) 100 mg tablet Take  by mouth daily.  POLYETHYLENE GLYCOL 3350 (MIRALAX PO) Take  by mouth as needed.  Glucosamine Sulfate Sodium Cl 1,000 mg Tab Take  by mouth two (2) times a day. No current facility-administered medications for this visit. Visit Vitals  /79   Pulse 65   Ht 5' 6\" (1.676 m)   Wt 80 kg (176 lb 6.4 oz)   BMI 28.47 kg/m²         Physical Exam  Constitutional:       Appearance: She is well-developed. HENT:      Head: Normocephalic and atraumatic. Eyes:      Conjunctiva/sclera: Conjunctivae normal.   Neck:      Thyroid: No thyromegaly. Vascular: No JVD. Trachea: No tracheal deviation. Cardiovascular:      Rate and Rhythm: Normal rate and regular rhythm. Chest Wall: PMI is not displaced. Pulses: No decreased pulses. Heart sounds: Murmur heard. Early systolic murmur is present at the upper right sternal border. No gallop. Pulmonary:      Effort: No respiratory distress. Breath sounds: No wheezing or rales. Chest:      Chest wall: No tenderness. Abdominal:      Palpations: Abdomen is soft. Tenderness: There is no abdominal tenderness. Musculoskeletal:      Cervical back: Neck supple. Skin:     General: Skin is warm. Neurological:      Mental Status: She is alert and oriented to person, place, and time. Ms. Adryan Camilo has a reminder for a \"due or due soon\" health maintenance. I have asked that she contact her primary care provider for follow-up on this health maintenance. No flowsheet data found. SUMMARY:3/2018  Left ventricle: Systolic function was normal. Ejection fraction was  estimated in the range of 55 % to 60 %.  There were no regional wall motion  abnormalities. Doppler parameters were consistent with abnormal left  ventricular relaxation (grade 1 diastolic dysfunction). Mitral valve: There was mild annular calcification. There was mild  regurgitation. Tricuspid valve: There was moderate to severe regurgitation. Pulmonic valve: There was mild regurgitation. I Have personally reviewed recent relevant labs available and discussed with patient  Care Everywhere Result Report  LIPID COMPLETE PANELResulted: 8/6/2020 5:37 PM  [address]  Component Name Value Ref Range   Cholesterol 148 110 - 200 mg/dL   Triglyceride 83 40 - 149 mg/dL   HDL 37 (L) >=40 mg/dL   Cholesterol/HDL 4.0 0.0 - 5.0    Non-HDL Cholesterol 111 <130 mg/dL   LDL CALCULATION 94 50 - 99 mg/dL   VLDL CALCULATION 17 8 - 30 mg/dL   LDL/HDL Ratio 2.5     I Have personally reviewed recent relevant labs available and discussed with patient  8/2020  Lipid, CBC, BMP, TSH  Echo Cardiogram Complete1/21/2021  [address]  Component Name Value Ref Range   EF Echo 65     Result Impression   :   NORMAL LEFT VENTRICULAR CAVITY SIZE AND SYSTOLIC FUNCTION WITH A BI-PLANE EJECTION FRACTION   OF  65 %.  BASAL SEPTAL HYPERTROPHY OF THE LEFT VENTRICLE. UNABLE TO ASSESS DIASTOLIC FUNCTION IN THIS PATIENT DUE TO IRREGULAR RHYTHM. DILATED LEFT ATRIUM. DILATED RIGHT ATRIUM. MITRAL ANNULAR CALCIFICATION WITH MILD MITRAL REGURGITATION. SCLEROTIC TRILEAFLET AORTIC VALVE WITH MILD AORTIC REGURGITATION. MODERATE TRICUSPID REGURGITATION WITH A RVSP OF 42 MMHG   TRACE PULMONIC REGURGITATION. NO EVIDENCE OF PERICARDIAL EFFUSION. NO MASSES, SHUNTS OR THROMBI SEEN. Assessment       ICD-10-CM ICD-9-CM    1. Persistent atrial fibrillation (HCC)  I48.19 427.31     Rate controlled continue treatment   2. Essential hypertension, benign  I10 401.1     Stable monitor   3. Hypercholesterolemia  E78.00 272.0     Continue treatment lab with PCP   4.  Mitral valve insufficiency and aortic valve insufficiency  I08.0 396.3     Stable symptom monitor   5. Current use of long term anticoagulation  Z79.01 V58.61     For A. fib tolerating.   3/2018-recurrent a fib  Start eliquis and stop asa  ? Rate control v/s rhythm control -decide at next visit    4/2018  Decided to continue rate control and anticoagulation    6/2020  Cardiac status stable. Continue with rate control and anticoagulation  12/2020  Cardiac status stable. Mildly elevated blood pressure continue to monitor. Off statin and gemfibrozil due to feeling sluggish. She has felt better after stopping that. Monitor clinically  2/2021  Recent admission with A. fib with slow ventricular rate in 30s. Now off Coreg and Cardizem. Rate remains controlled with persistent A. fib. Continue rate control and anticoagulation. Blood pressure control on amlodipine and HCTZ  6/2021  Cardiac status stable. Rate controlled. Now on amlodipine but has noticed ankle swelling. Likely dependent edema from that. Will reduce amlodipine to 5 mg a day as blood pressure is controlled. Use extra dose of HCTZ as needed. Monitor  12/2021  Cardiac status stable. Edema stable. Continue current medical management. There are no discontinued medications. No orders of the defined types were placed in this encounter. Follow-up and Dispositions    · Return in about 6 months (around 6/2/2022).

## 2021-12-02 NOTE — PROGRESS NOTES
1. Have you been to the ER, urgent care clinic since your last visit? Hospitalized since your last visit? No    2. Have you seen or consulted any other health care providers outside of the [address] [place] [address] since your last visit? Include any pap smears or colon screening.       Yes Where: [facility]/PCP

## 2022-01-13 DIAGNOSIS — I48.0 PAROXYSMAL ATRIAL FIBRILLATION (HCC): ICD-10-CM

## 2022-01-13 NOTE — TELEPHONE ENCOUNTER
Requested Prescriptions     Pending Prescriptions Disp Refills    apixaban (Eliquis) 5 mg tablet 180 Tablet 3     Sig: Take 1 Tablet by mouth two (2) times a day.   '

## 2022-03-16 RX ORDER — AMLODIPINE BESYLATE 5 MG/1
TABLET ORAL
Qty: 90 TABLET | Refills: 3 | Status: SHIPPED | OUTPATIENT
Start: 2022-03-16

## 2022-03-16 RX ORDER — HYDROCHLOROTHIAZIDE 12.5 MG/1
TABLET ORAL
Qty: 90 TABLET | Refills: 3 | Status: SHIPPED | OUTPATIENT
Start: 2022-03-16

## 2022-03-19 PROBLEM — Z79.01 CURRENT USE OF LONG TERM ANTICOAGULATION: Status: ACTIVE | Noted: 2019-10-24

## 2022-06-16 ENCOUNTER — OFFICE VISIT (OUTPATIENT)
Dept: CARDIOLOGY CLINIC | Age: 87
End: 2022-06-16
Payer: MEDICARE

## 2022-06-16 VITALS
BODY MASS INDEX: 27.8 KG/M2 | HEIGHT: 66 IN | SYSTOLIC BLOOD PRESSURE: 121 MMHG | DIASTOLIC BLOOD PRESSURE: 72 MMHG | HEART RATE: 73 BPM | WEIGHT: 173 LBS

## 2022-06-16 DIAGNOSIS — E78.00 HYPERCHOLESTEROLEMIA: ICD-10-CM

## 2022-06-16 DIAGNOSIS — Z79.01 CURRENT USE OF LONG TERM ANTICOAGULATION: ICD-10-CM

## 2022-06-16 DIAGNOSIS — I48.19 PERSISTENT ATRIAL FIBRILLATION (HCC): Primary | ICD-10-CM

## 2022-06-16 DIAGNOSIS — I10 ESSENTIAL HYPERTENSION, BENIGN: ICD-10-CM

## 2022-06-16 DIAGNOSIS — I08.0 MITRAL VALVE INSUFFICIENCY AND AORTIC VALVE INSUFFICIENCY: ICD-10-CM

## 2022-06-16 PROCEDURE — 1123F ACP DISCUSS/DSCN MKR DOCD: CPT | Performed by: INTERNAL MEDICINE

## 2022-06-16 PROCEDURE — G8417 CALC BMI ABV UP PARAM F/U: HCPCS | Performed by: INTERNAL MEDICINE

## 2022-06-16 PROCEDURE — 1090F PRES/ABSN URINE INCON ASSESS: CPT | Performed by: INTERNAL MEDICINE

## 2022-06-16 PROCEDURE — G8752 SYS BP LESS 140: HCPCS | Performed by: INTERNAL MEDICINE

## 2022-06-16 PROCEDURE — G8400 PT W/DXA NO RESULTS DOC: HCPCS | Performed by: INTERNAL MEDICINE

## 2022-06-16 PROCEDURE — 1101F PT FALLS ASSESS-DOCD LE1/YR: CPT | Performed by: INTERNAL MEDICINE

## 2022-06-16 PROCEDURE — 99214 OFFICE O/P EST MOD 30 MIN: CPT | Performed by: INTERNAL MEDICINE

## 2022-06-16 PROCEDURE — G8754 DIAS BP LESS 90: HCPCS | Performed by: INTERNAL MEDICINE

## 2022-06-16 PROCEDURE — G8427 DOCREV CUR MEDS BY ELIG CLIN: HCPCS | Performed by: INTERNAL MEDICINE

## 2022-06-16 PROCEDURE — G8536 NO DOC ELDER MAL SCRN: HCPCS | Performed by: INTERNAL MEDICINE

## 2022-06-16 PROCEDURE — G8432 DEP SCR NOT DOC, RNG: HCPCS | Performed by: INTERNAL MEDICINE

## 2022-06-16 NOTE — PROGRESS NOTES
HISTORY OF PRESENT ILLNESS  Angela Gómez is a 82 y.o. female. Patient with A fib,htn,mr,ai,tr. On follow up patient denies any chest pains,sob, palpitation or other significant symptoms. 12/2020  Patient seen today for follow-up. Denies any chest pain or shortness of breath. Few months ago patient was feeling sluggish at that time after evaluation her statins were stopped. Patient feels better at present. 2/2021  Patient is here for follow-up she was admitted recently in hospital with atrial fibrillation with slow ventricular rate in 30s. AV daniella blocking agent were discontinued in form of Coreg and Cardizem. Since then she has been doing better. Denies any chest pain palpitation shortness of breath or other symptom at present  6/2020  Patient seen today for follow-up. Has been noticing swelling on feet for about 2 weeks    Follow-up  Pertinent negatives include no chest pain, no abdominal pain, no headaches and no shortness of breath. Palpitations   The history is provided by the patient. This is a recurrent problem. The problem has not changed since onset. The problem occurs constantly. The problem is associated with nothing. Pertinent negatives include no fever, no chest pain, no claudication, no orthopnea, no PND, no abdominal pain, no nausea, no vomiting, no headaches, no dizziness, no weakness, no cough, no hemoptysis, no shortness of breath and no sputum production. Her past medical history is significant for hypertension. Hypertension  Pertinent negatives include no chest pain, no abdominal pain, no headaches and no shortness of breath. Cholesterol Problem  Pertinent negatives include no chest pain, no abdominal pain, no headaches and no shortness of breath. Valvular Heart Disease  The history is provided by the patient. This is a chronic problem. The problem occurs constantly. The problem has not changed since onset. Pertinent negatives include no chest pain, no abdominal pain, no headaches and no shortness of breath. Review of Systems   Constitutional: Negative for chills and fever. HENT: Negative for nosebleeds. Eyes: Negative for blurred vision and double vision. Respiratory: Negative for cough, hemoptysis, sputum production, shortness of breath and wheezing. Cardiovascular: Positive for palpitations. Negative for chest pain, orthopnea, claudication, leg swelling and PND. Gastrointestinal: Negative for abdominal pain, heartburn, nausea and vomiting. Musculoskeletal: Negative for myalgias. Skin: Negative for rash. Neurological: Negative for dizziness, weakness and headaches. Endo/Heme/Allergies: Does not bruise/bleed easily. Family History   Problem Relation Age of Onset    Heart Disease Neg Hx         No family history of heart disease       Past Medical History:   Diagnosis Date    Atrial fibrillation (HCC)     Back in sr, will continue pradaxa for 4 weeks, holter in 3 week and idecide on stopping pradaxa    Essential hypertension, benign     Mildly elevated    Hypercholesterolemia     Mitral valve insufficiency and aortic valve insufficiency     Mild ai, mild mr    Other and unspecified hyperlipidemia     Tricuspid valve disorders, non-rheumatic     Mild tr     Tricuspid valve disorders, specified as nonrheumatic     Mild tr       Past Surgical History:   Procedure Laterality Date    HX TUBAL LIGATION         Social History     Tobacco Use    Smoking status: Never Smoker    Smokeless tobacco: Never Used   Substance Use Topics    Alcohol use: No       No Known Allergies    Current Outpatient Medications   Medication Sig    amLODIPine (NORVASC) 5 mg tablet TAKE 1 TABLET EVERY DAY    hydroCHLOROthiazide (HYDRODIURIL) 12.5 mg tablet TAKE 1 TABLET EVERY DAY    apixaban (Eliquis) 5 mg tablet Take 1 Tablet by mouth two (2) times a day.  pravastatin (PRAVACHOL) 40 mg tablet Take 40 mg by mouth nightly.     triamcinolone acetonide (KENALOG) 0.1 % topical cream Apply  to affected area two (2) times a day. use thin layer    baclofen (LIORESAL) 10 mg tablet Take  by mouth as needed.  levothyroxine (SYNTHROID) 50 mcg tablet Take  by mouth Daily (before breakfast).  cholecalciferol (VITAMIN D3) 1,000 unit tablet Take  by mouth daily.  losartan (COZAAR) 100 mg tablet Take  by mouth daily.  POLYETHYLENE GLYCOL 3350 (MIRALAX PO) Take  by mouth as needed.  Glucosamine Sulfate Sodium Cl 1,000 mg Tab Take  by mouth two (2) times a day. No current facility-administered medications for this visit. Visit Vitals  /72 (BP 1 Location: Left upper arm, BP Patient Position: Sitting, BP Cuff Size: Adult)   Pulse 73   Ht 5' 6\" (1.676 m)   Wt 78.5 kg (173 lb)   BMI 27.92 kg/m²         Physical Exam  Constitutional:       Appearance: She is well-developed. HENT:      Head: Normocephalic and atraumatic. Eyes:      Conjunctiva/sclera: Conjunctivae normal.   Neck:      Thyroid: No thyromegaly. Vascular: No JVD. Trachea: No tracheal deviation. Cardiovascular:      Rate and Rhythm: Normal rate and regular rhythm. Chest Wall: PMI is not displaced. Pulses: No decreased pulses. Heart sounds: Murmur heard. Early systolic murmur is present at the upper right sternal border. No gallop. Pulmonary:      Effort: No respiratory distress. Breath sounds: No wheezing or rales. Chest:      Chest wall: No tenderness. Abdominal:      Palpations: Abdomen is soft. Tenderness: There is no abdominal tenderness. Musculoskeletal:      Cervical back: Neck supple. Skin:     General: Skin is warm. Neurological:      Mental Status: She is alert and oriented to person, place, and time. Ms. Komal Jo has a reminder for a \"due or due soon\" health maintenance. I have asked that she contact her primary care provider for follow-up on this health maintenance. No flowsheet data found.   SUMMARY:3/2018  Left ventricle: Systolic function was normal. Ejection fraction was  estimated in the range of 55 % to 60 %. There were no regional wall motion  abnormalities. Doppler parameters were consistent with abnormal left  ventricular relaxation (grade 1 diastolic dysfunction). Mitral valve: There was mild annular calcification. There was mild  regurgitation. Tricuspid valve: There was moderate to severe regurgitation. Pulmonic valve: There was mild regurgitation. I Have personally reviewed recent relevant labs available and discussed with patient  Care Everywhere Result Report  LIPID COMPLETE PANELResulted: 8/6/2020 5:37 PM  [email or web address]  Component Name Value Ref Range   Cholesterol 148 110 - 200 mg/dL   Triglyceride 83 40 - 149 mg/dL   HDL 37 (L) >=40 mg/dL   Cholesterol/HDL 4.0 0.0 - 5.0    Non-HDL Cholesterol 111 <130 mg/dL   LDL CALCULATION 94 50 - 99 mg/dL   VLDL CALCULATION 17 8 - 30 mg/dL   LDL/HDL Ratio 2.5     I Have personally reviewed recent relevant labs available and discussed with patient  8/2020  Lipid, CBC, BMP, TSH  Echo Cardiogram Complete1/21/2021 [email or web address]  Component Name Value Ref Range   EF Echo 65     Result Impression   :   NORMAL LEFT VENTRICULAR CAVITY SIZE AND SYSTOLIC FUNCTION WITH A BI-PLANE EJECTION FRACTION   OF  65 %.  BASAL SEPTAL HYPERTROPHY OF THE LEFT VENTRICLE. UNABLE TO ASSESS DIASTOLIC FUNCTION IN THIS PATIENT DUE TO IRREGULAR RHYTHM. DILATED LEFT ATRIUM. DILATED RIGHT ATRIUM. MITRAL ANNULAR CALCIFICATION WITH MILD MITRAL REGURGITATION. SCLEROTIC TRILEAFLET AORTIC VALVE WITH MILD AORTIC REGURGITATION. MODERATE TRICUSPID REGURGITATION WITH A RVSP OF 42 MMHG   TRACE PULMONIC REGURGITATION. NO EVIDENCE OF PERICARDIAL EFFUSION. NO MASSES, SHUNTS OR THROMBI SEEN. I Have personally reviewed recent relevant labs available and discussed with patient  4/2022-lipid, renal panel,   Assessment       ICD-10-CM ICD-9-CM    1.  Persistent atrial fibrillation (HCC)  I48.19 427.31 Stable rate controlled continue treatment   2. Essential hypertension, benign  I10 401.1     Stable monitor   3. Hypercholesterolemia  E78.00 272.0     Continue treatment lab with PCP   4. Mitral valve insufficiency and aortic valve insufficiency  I08.0 396.3     Stable monitor   5. Current use of long term anticoagulation  Z79.01 V58.61     Continue for A. fib   3/2018-recurrent a fib  Start eliquis and stop asa  ? Rate control v/s rhythm control -decide at next visit    4/2018  Decided to continue rate control and anticoagulation    6/2020  Cardiac status stable. Continue with rate control and anticoagulation  12/2020  Cardiac status stable. Mildly elevated blood pressure continue to monitor. Off statin and gemfibrozil due to feeling sluggish. She has felt better after stopping that. Monitor clinically  2/2021  Recent admission with A. fib with slow ventricular rate in 30s. Now off Coreg and Cardizem. Rate remains controlled with persistent A. fib. Continue rate control and anticoagulation. Blood pressure control on amlodipine and HCTZ  6/2021  Cardiac status stable. Rate controlled. Now on amlodipine but has noticed ankle swelling. Likely dependent edema from that. Will reduce amlodipine to 5 mg a day as blood pressure is controlled. Use extra dose of HCTZ as needed. Monitor  12/2021  Cardiac status stable. Edema stable. Continue current medical management. 6/2022  Stable cardiac status. Rate controlled continue current medical management. Blood pressure controlled  There are no discontinued medications. No orders of the defined types were placed in this encounter. Follow-up and Dispositions    · Return in about 6 months (around 12/16/2022).

## 2022-11-15 NOTE — PROGRESS NOTES
1. Have you been to the ER, urgent care clinic since your last visit? Hospitalized since your last visit? No    2. Have you seen or consulted any other health care providers outside of the [address] [place] [address] since your last visit? Include any pap smears or colon screening.  No Bleeding that does not stop/Swelling that gets worse/Pain not relieved by Medications Bleeding that does not stop/Swelling that gets worse/Pain not relieved by Medications/Fever greater than (need to indicate Fahrenheit or Celsius)/Inability to tolerate liquids or foods

## 2022-12-16 ENCOUNTER — OFFICE VISIT (OUTPATIENT)
Dept: CARDIOLOGY CLINIC | Age: 87
End: 2022-12-16
Payer: MEDICARE

## 2022-12-16 VITALS
HEART RATE: 70 BPM | OXYGEN SATURATION: 98 % | BODY MASS INDEX: 28.45 KG/M2 | DIASTOLIC BLOOD PRESSURE: 75 MMHG | WEIGHT: 177 LBS | HEIGHT: 66 IN | SYSTOLIC BLOOD PRESSURE: 138 MMHG

## 2022-12-16 DIAGNOSIS — I08.0 MITRAL VALVE INSUFFICIENCY AND AORTIC VALVE INSUFFICIENCY: ICD-10-CM

## 2022-12-16 DIAGNOSIS — Z79.01 CURRENT USE OF LONG TERM ANTICOAGULATION: ICD-10-CM

## 2022-12-16 DIAGNOSIS — E78.00 HYPERCHOLESTEROLEMIA: ICD-10-CM

## 2022-12-16 DIAGNOSIS — I10 ESSENTIAL HYPERTENSION, BENIGN: ICD-10-CM

## 2022-12-16 DIAGNOSIS — I48.19 PERSISTENT ATRIAL FIBRILLATION (HCC): Primary | ICD-10-CM

## 2022-12-16 RX ORDER — DEXTROMETHORPHAN HYDROBROMIDE, GUAIFENESIN 5; 100 MG/5ML; MG/5ML
650 LIQUID ORAL 2 TIMES DAILY
COMMUNITY

## 2022-12-16 RX ORDER — TRAZODONE HYDROCHLORIDE 100 MG/1
TABLET ORAL
COMMUNITY
Start: 2022-10-14

## 2022-12-16 NOTE — PROGRESS NOTES
HISTORY OF PRESENT ILLNESS  Bryan Barry is a 82 y.o. female. Patient with A fib,htn,mr,ai,tr. On follow up patient denies any chest pains,sob, palpitation or other significant symptoms. 12/2020  Patient seen today for follow-up. Denies any chest pain or shortness of breath. Few months ago patient was feeling sluggish at that time after evaluation her statins were stopped. Patient feels better at present. 2/2021  Patient is here for follow-up she was admitted recently in hospital with atrial fibrillation with slow ventricular rate in 30s. AV daniella blocking agent were discontinued in form of Coreg and Cardizem. Since then she has been doing better. Denies any chest pain palpitation shortness of breath or other symptom at present  6/2020  Patient seen today for follow-up. Has been noticing swelling on feet for about 2 weeks    Follow-up  Pertinent negatives include no chest pain, no abdominal pain, no headaches and no shortness of breath. Palpitations   The history is provided by the Patient. This is a recurrent problem. The problem has not changed since onset. The problem occurs constantly. The problem is associated with nothing. Pertinent negatives include no fever, no chest pain, no claudication, no orthopnea, no PND, no abdominal pain, no nausea, no vomiting, no headaches, no dizziness, no weakness, no cough, no hemoptysis, no shortness of breath and no sputum production. Her past medical history is significant for hypertension. Hypertension  Pertinent negatives include no chest pain, no abdominal pain, no headaches and no shortness of breath. Cholesterol Problem  Pertinent negatives include no chest pain, no abdominal pain, no headaches and no shortness of breath. Valvular Heart Disease  The history is provided by the Patient. This is a chronic problem. The problem occurs constantly. The problem has not changed since onset. Pertinent negatives include no chest pain, no abdominal pain, no headaches and no shortness of breath. Review of Systems   Constitutional:  Negative for chills and fever. HENT:  Negative for nosebleeds. Eyes:  Negative for blurred vision and double vision. Respiratory:  Negative for cough, hemoptysis, sputum production, shortness of breath and wheezing. Cardiovascular:  Positive for palpitations. Negative for chest pain, orthopnea, claudication, leg swelling and PND. Gastrointestinal:  Negative for abdominal pain, heartburn, nausea and vomiting. Musculoskeletal:  Negative for myalgias. Skin:  Negative for rash. Neurological:  Negative for dizziness, weakness and headaches. Endo/Heme/Allergies:  Does not bruise/bleed easily. Family History   Problem Relation Age of Onset    Heart Disease Neg Hx         No family history of heart disease       Past Medical History:   Diagnosis Date    Atrial fibrillation ([address]     Back in sr, will continue pradaxa for 4 weeks, holter in 3 week and idecide on stopping pradaxa    Essential hypertension, benign     Mildly elevated    Hypercholesterolemia     Mitral valve insufficiency and aortic valve insufficiency     Mild ai, mild mr    Other and unspecified hyperlipidemia     Tricuspid valve disorders, non-rheumatic     Mild tr     Tricuspid valve disorders, specified as nonrheumatic     Mild tr       Past Surgical History:   Procedure Laterality Date    HX TUBAL LIGATION         Social History     Tobacco Use    Smoking status: Never    Smokeless tobacco: Never   Substance Use Topics    Alcohol use: No       No Known Allergies    Current Outpatient Medications   Medication Sig    traZODone (DESYREL) 100 mg tablet     acetaminophen (Tylenol Arthritis Pain) 650 mg TbER Take 650 mg by mouth two (2) times a day. amLODIPine (NORVASC) 5 mg tablet TAKE 1 TABLET EVERY DAY    hydroCHLOROthiazide (HYDRODIURIL) 12.5 mg tablet TAKE 1 TABLET EVERY DAY    apixaban (Eliquis) 5 mg tablet Take 1 Tablet by mouth two (2) times a day. pravastatin (PRAVACHOL) 40 mg tablet Take 40 mg by mouth nightly. triamcinolone acetonide (KENALOG) 0.1 % topical cream Apply  to affected area two (2) times a day. use thin layer    baclofen (LIORESAL) 10 mg tablet Take  by mouth as needed. levothyroxine (SYNTHROID) 50 mcg tablet Take  by mouth Daily (before breakfast). cholecalciferol (VITAMIN D3) (1000 Units /25 mcg) tablet Take  by mouth daily. losartan (COZAAR) 100 mg tablet Take  by mouth daily. POLYETHYLENE GLYCOL 3350 (MIRALAX PO) Take  by mouth as needed. Glucosamine Sulfate Sodium Cl 1,000 mg Tab Take  by mouth two (2) times a day. No current facility-administered medications for this visit. Visit Vitals  /75 (BP 1 Location: Left upper arm, BP Patient Position: Sitting, BP Cuff Size: Adult)   Pulse 70   Ht 5' 6\" (1.676 m)   Wt 80.3 kg (177 lb)   SpO2 98%   BMI 28.57 kg/m²         Physical Exam  Constitutional:       Appearance: She is well-developed. HENT:      Head: Normocephalic and atraumatic. Eyes:      Conjunctiva/sclera: Conjunctivae normal.   Neck:      Thyroid: No thyromegaly. Vascular: No JVD. Trachea: No tracheal deviation. Cardiovascular:      Rate and Rhythm: Normal rate and regular rhythm. Chest Wall: PMI is not displaced. Pulses: No decreased pulses. Heart sounds: Murmur heard. Early systolic murmur is present at the upper right sternal border. No gallop. Pulmonary:      Effort: No respiratory distress. Breath sounds: No wheezing or rales. Chest:      Chest wall: No tenderness. Abdominal:      Palpations: Abdomen is soft. Tenderness: There is no abdominal tenderness. Musculoskeletal:      Cervical back: Neck supple. Skin:     General: Skin is warm. Neurological:      Mental Status: She is alert and oriented to person, place, and time. Ms. Tana Dixon has a reminder for a \"due or due soon\" health maintenance.  I have asked that she contact her primary care provider for follow-up on this health maintenance. No flowsheet data found. SUMMARY:3/2018  Left ventricle: Systolic function was normal. Ejection fraction was  estimated in the range of 55 % to 60 %. There were no regional wall motion  abnormalities. Doppler parameters were consistent with abnormal left  ventricular relaxation (grade 1 diastolic dysfunction). Mitral valve: There was mild annular calcification. There was mild  regurgitation. Tricuspid valve: There was moderate to severe regurgitation. Pulmonic valve: There was mild regurgitation. I Have personally reviewed recent relevant labs available and discussed with patient  Care Everywhere Result Report  LIPID COMPLETE PANELResulted: 8/6/2020 5:37 PM  [facility]  Component Name Value Ref Range   Cholesterol 148 110 - 200 mg/dL   Triglyceride 83 40 - 149 mg/dL   HDL 37 (L) >=40 mg/dL   Cholesterol/HDL 4.0 0.0 - 5.0    Non-HDL Cholesterol 111 <130 mg/dL   LDL CALCULATION 94 50 - 99 mg/dL   VLDL CALCULATION 17 8 - 30 mg/dL   LDL/HDL Ratio 2.5     I Have personally reviewed recent relevant labs available and discussed with patient  8/2020  Lipid, CBC, BMP, TSH  Echo Cardiogram Complete1/21/2021  [facility]  Component Name Value Ref Range   EF Echo 65     Result Impression   :   NORMAL LEFT VENTRICULAR CAVITY SIZE AND SYSTOLIC FUNCTION WITH A BI-PLANE EJECTION FRACTION   OF  65 %. BASAL SEPTAL HYPERTROPHY OF THE LEFT VENTRICLE. UNABLE TO ASSESS DIASTOLIC FUNCTION IN THIS PATIENT DUE TO IRREGULAR RHYTHM. DILATED LEFT ATRIUM. DILATED RIGHT ATRIUM. MITRAL ANNULAR CALCIFICATION WITH MILD MITRAL REGURGITATION. SCLEROTIC TRILEAFLET AORTIC VALVE WITH MILD AORTIC REGURGITATION. MODERATE TRICUSPID REGURGITATION WITH A RVSP OF 42 MMHG   TRACE PULMONIC REGURGITATION. NO EVIDENCE OF PERICARDIAL EFFUSION. NO MASSES, SHUNTS OR THROMBI SEEN.      I Have personally reviewed recent relevant labs available and discussed with patient  4/2022-lipid, renal panel,   Assessment       ICD-10-CM ICD-9-CM    1. Persistent atrial fibrillation (HCC)  I48.19 427.31     Stable continue treatment monitor      2. Essential hypertension, benign  I10 401.1     Controlled continue treatment      3. Current use of long term anticoagulation  Z79.01 V58.61     Continue for A. fib stable      4. Hypercholesterolemia  E78.00 272.0     Continue treatment lab with PCP      5. Mitral valve insufficiency and aortic valve insufficiency  I08.0 396.3     Stable monitor      3/2018-recurrent a fib  Start eliquis and stop asa  ? Rate control v/s rhythm control -decide at next visit    4/2018  Decided to continue rate control and anticoagulation    6/2020  Cardiac status stable. Continue with rate control and anticoagulation  12/2020  Cardiac status stable. Mildly elevated blood pressure continue to monitor. Off statin and gemfibrozil due to feeling sluggish. She has felt better after stopping that. Monitor clinically  2/2021  Recent admission with A. fib with slow ventricular rate in 30s. Now off Coreg and Cardizem. Rate remains controlled with persistent A. fib. Continue rate control and anticoagulation. Blood pressure control on amlodipine and HCTZ  6/2021  Cardiac status stable. Rate controlled. Now on amlodipine but has noticed ankle swelling. Likely dependent edema from that. Will reduce amlodipine to 5 mg a day as blood pressure is controlled. Use extra dose of HCTZ as needed. Monitor  12/2021  Cardiac status stable. Edema stable. Continue current medical management. 6/2022  Stable cardiac status. Rate controlled continue current medical management. Blood pressure controlled  12/2022  Stable cardiac status. Blood pressure controlled continue treatment  There are no discontinued medications. No orders of the defined types were placed in this encounter. Follow-up and Dispositions    Return in about 6 months (around 6/16/2023).

## 2022-12-16 NOTE — PROGRESS NOTES
1. Have you been to the ER, urgent care clinic since your last visit? Hospitalized since your last visit? No    2. Have you seen or consulted any other health care providers outside of the [address] [place] [address] since your last visit? Include any pap smears or colon screening.  No

## 2022-12-22 DIAGNOSIS — I48.0 PAROXYSMAL ATRIAL FIBRILLATION (HCC): ICD-10-CM

## 2022-12-22 NOTE — TELEPHONE ENCOUNTER
Requested Prescriptions     Pending Prescriptions Disp Refills    apixaban (Eliquis) 5 mg tablet 180 Tablet 6     Sig: Take 1 Tablet by mouth two (2) times a day.

## 2023-01-23 RX ORDER — AMLODIPINE BESYLATE 5 MG/1
TABLET ORAL
Qty: 90 TABLET | Refills: 3 | Status: SHIPPED | OUTPATIENT
Start: 2023-01-23

## 2023-02-02 RX ORDER — HYDROCHLOROTHIAZIDE 12.5 MG/1
TABLET ORAL
Qty: 90 TABLET | Refills: 3 | Status: SHIPPED | OUTPATIENT
Start: 2023-02-02

## 2023-04-23 RX ORDER — HYDROCHLOROTHIAZIDE 12.5 MG/1
TABLET ORAL
Qty: 90 TABLET | Refills: 3 | OUTPATIENT
Start: 2023-04-23

## 2023-06-29 ENCOUNTER — OFFICE VISIT (OUTPATIENT)
Age: 88
End: 2023-06-29
Payer: COMMERCIAL

## 2023-06-29 VITALS
SYSTOLIC BLOOD PRESSURE: 122 MMHG | HEART RATE: 62 BPM | HEIGHT: 66 IN | WEIGHT: 176 LBS | BODY MASS INDEX: 28.28 KG/M2 | OXYGEN SATURATION: 97 % | DIASTOLIC BLOOD PRESSURE: 77 MMHG

## 2023-06-29 DIAGNOSIS — E78.00 PURE HYPERCHOLESTEROLEMIA, UNSPECIFIED: ICD-10-CM

## 2023-06-29 DIAGNOSIS — Z79.01 LONG TERM (CURRENT) USE OF ANTICOAGULANTS: ICD-10-CM

## 2023-06-29 DIAGNOSIS — I48.19 OTHER PERSISTENT ATRIAL FIBRILLATION (HCC): Primary | ICD-10-CM

## 2023-06-29 DIAGNOSIS — I08.0 MITRAL AND AORTIC REGURGITATION: ICD-10-CM

## 2023-06-29 DIAGNOSIS — I10 ESSENTIAL (PRIMARY) HYPERTENSION: ICD-10-CM

## 2023-06-29 PROCEDURE — 99214 OFFICE O/P EST MOD 30 MIN: CPT | Performed by: INTERNAL MEDICINE

## 2023-06-29 PROCEDURE — 1123F ACP DISCUSS/DSCN MKR DOCD: CPT | Performed by: INTERNAL MEDICINE

## 2023-06-29 ASSESSMENT — PATIENT HEALTH QUESTIONNAIRE - PHQ9
SUM OF ALL RESPONSES TO PHQ QUESTIONS 1-9: 0
SUM OF ALL RESPONSES TO PHQ QUESTIONS 1-9: 0
SUM OF ALL RESPONSES TO PHQ9 QUESTIONS 1 & 2: 0
SUM OF ALL RESPONSES TO PHQ QUESTIONS 1-9: 0
2. FEELING DOWN, DEPRESSED OR HOPELESS: 0
SUM OF ALL RESPONSES TO PHQ QUESTIONS 1-9: 0
DEPRESSION UNABLE TO ASSESS: FUNCTIONAL CAPACITY MOTIVATION LIMITS ACCURACY
1. LITTLE INTEREST OR PLEASURE IN DOING THINGS: 0

## 2023-08-14 NOTE — TELEPHONE ENCOUNTER
Requested Prescriptions     Pending Prescriptions Disp Refills    apixaban (ELIQUIS) 5 MG TABS tablet 180 tablet 1     Sig: Take 1 tablet by mouth 2 times daily

## 2023-08-15 RX ORDER — APIXABAN 5 MG/1
TABLET, FILM COATED ORAL
Qty: 180 TABLET | Refills: 1 | Status: SHIPPED | OUTPATIENT
Start: 2023-08-15

## 2023-08-24 NOTE — PATIENT INSTRUCTIONS
Continue current medications    Follow up in 6 months or sooner if needed. Low Sodium Diet (2,000 Milligram): Care Instructions  Your Care Instructions    Too much sodium causes your body to hold on to extra water. This can raise your blood pressure and force your heart and kidneys to work harder. In very serious cases, this could cause you to be put in the hospital. It might even be life-threatening. By limiting sodium, you will feel better and lower your risk of serious problems. The most common source of sodium is salt. People get most of the salt in their diet from canned, prepared, and packaged foods. Fast food and restaurant meals also are very high in sodium. Your doctor will probably limit your sodium to less than 2,000 milligrams (mg) a day. This limit counts all the sodium in prepared and packaged foods and any salt you add to your food. Follow-up care is a key part of your treatment and safety. Be sure to make and go to all appointments, and call your doctor if you are having problems. It's also a good idea to know your test results and keep a list of the medicines you take. How can you care for yourself at home? Read food labels  · Read labels on cans and food packages. The labels tell you how much sodium is in each serving. Make sure that you look at the serving size. If you eat more than the serving size, you have eaten more sodium. · Food labels also tell you the Percent Daily Value for sodium. Choose products with low Percent Daily Values for sodium. · Be aware that sodium can come in forms other than salt, including monosodium glutamate (MSG), sodium citrate, and sodium bicarbonate (baking soda). MSG is often added to Asian food. When you eat out, you can sometimes ask for food without MSG or added salt.   Buy low-sodium foods  · Buy foods that are labeled \"unsalted\" (no salt added), \"sodium-free\" (less than 5 mg of sodium per serving), or \"low-sodium\" (less than 140 mg of sodium per serving). Foods labeled \"reduced-sodium\" and \"light sodium\" may still have too much sodium. Be sure to read the label to see how much sodium you are getting. · Buy fresh vegetables, or frozen vegetables without added sauces. Buy low-sodium versions of canned vegetables, soups, and other canned goods. Prepare low-sodium meals  · Cut back on the amount of salt you use in cooking. This will help you adjust to the taste. Do not add salt after cooking. One teaspoon of salt has about 2,300 mg of sodium. · Take the salt shaker off the table. · Flavor your food with garlic, lemon juice, onion, vinegar, herbs, and spices. Do not use soy sauce, lite soy sauce, steak sauce, onion salt, garlic salt, celery salt, mustard, or ketchup on your food. · Use low-sodium salad dressings, sauces, and ketchup. Or make your own salad dressings and sauces without adding salt. · Use less salt (or none) when recipes call for it. You can often use half the salt a recipe calls for without losing flavor. Other foods such as rice, pasta, and grains do not need added salt. · Rinse canned vegetables, and cook them in fresh water. This removes some--but not all--of the salt. · Avoid water that is naturally high in sodium or that has been treated with water softeners, which add sodium. Call your local water company to find out the sodium content of your water supply. If you buy bottled water, read the label and choose a sodium-free brand. Avoid high-sodium foods  · Avoid eating:  ? Smoked, cured, salted, and canned meat, fish, and poultry. ? Ham, caceres, hot dogs, and luncheon meats. ? Regular, hard, and processed cheese and regular peanut butter. ? Crackers with salted tops, and other salted snack foods such as pretzels, chips, and salted popcorn. ? Frozen prepared meals, unless labeled low-sodium. ? Canned and dried soups, broths, and bouillon, unless labeled sodium-free or low-sodium. ?  Canned vegetables, unless labeled sodium-free or low-sodium. ? Western Jen fries, pizza, tacos, and other fast foods. ? Pickles, olives, ketchup, and other condiments, especially soy sauce, unless labeled sodium-free or low-sodium. Where can you learn more? Go to http://brittni-vicky.info/. Enter Z251 in the search box to learn more about \"Low Sodium Diet (2,000 Milligram): Care Instructions. \"  Current as of: March 29, 2018  Content Version: 11.8  © 3585-1563 [employer], [employer]. Care instructions adapted under license by [employer] (which disclaims liability or warranty for this information). If you have questions about a medical condition or this instruction, always ask your healthcare professional. [address] any warranty or liability for your use of this information. [Negative] : Heme/Lymph

## 2023-10-13 ENCOUNTER — HOSPITAL ENCOUNTER (OUTPATIENT)
Age: 88
End: 2023-10-13
Payer: MEDICARE

## 2023-10-13 DIAGNOSIS — N30.00 ACUTE CYSTITIS WITHOUT HEMATURIA: ICD-10-CM

## 2023-10-13 DIAGNOSIS — N39.0 RECURRENT UTI: ICD-10-CM

## 2023-10-13 PROCEDURE — 76770 US EXAM ABDO BACK WALL COMP: CPT

## 2023-10-17 NOTE — RESULT ENCOUNTER NOTE
Ultrasound not showing any kidney stones or issues with the urinary tract. They see a cyst outside the urinary tract in the left quadrant. Can we get a CT with and without contrast to better assess this.

## 2023-12-01 ENCOUNTER — OFFICE VISIT (OUTPATIENT)
Age: 88
End: 2023-12-01
Payer: MEDICARE

## 2023-12-01 VITALS
HEIGHT: 66 IN | DIASTOLIC BLOOD PRESSURE: 75 MMHG | SYSTOLIC BLOOD PRESSURE: 116 MMHG | OXYGEN SATURATION: 96 % | WEIGHT: 175 LBS | HEART RATE: 56 BPM | BODY MASS INDEX: 28.12 KG/M2

## 2023-12-01 DIAGNOSIS — Z79.01 LONG TERM (CURRENT) USE OF ANTICOAGULANTS: ICD-10-CM

## 2023-12-01 DIAGNOSIS — I48.19 OTHER PERSISTENT ATRIAL FIBRILLATION (HCC): Primary | ICD-10-CM

## 2023-12-01 DIAGNOSIS — I08.0 MITRAL AND AORTIC REGURGITATION: ICD-10-CM

## 2023-12-01 DIAGNOSIS — I10 ESSENTIAL (PRIMARY) HYPERTENSION: ICD-10-CM

## 2023-12-01 DIAGNOSIS — E78.00 PURE HYPERCHOLESTEROLEMIA, UNSPECIFIED: ICD-10-CM

## 2023-12-01 PROCEDURE — G8419 CALC BMI OUT NRM PARAM NOF/U: HCPCS | Performed by: INTERNAL MEDICINE

## 2023-12-01 PROCEDURE — 1123F ACP DISCUSS/DSCN MKR DOCD: CPT | Performed by: INTERNAL MEDICINE

## 2023-12-01 PROCEDURE — 1036F TOBACCO NON-USER: CPT | Performed by: INTERNAL MEDICINE

## 2023-12-01 PROCEDURE — G8484 FLU IMMUNIZE NO ADMIN: HCPCS | Performed by: INTERNAL MEDICINE

## 2023-12-01 PROCEDURE — G8428 CUR MEDS NOT DOCUMENT: HCPCS | Performed by: INTERNAL MEDICINE

## 2023-12-01 PROCEDURE — 1090F PRES/ABSN URINE INCON ASSESS: CPT | Performed by: INTERNAL MEDICINE

## 2023-12-01 PROCEDURE — 99214 OFFICE O/P EST MOD 30 MIN: CPT | Performed by: INTERNAL MEDICINE

## 2023-12-01 RX ORDER — OXYBUTYNIN CHLORIDE 10 MG/1
TABLET, EXTENDED RELEASE ORAL
COMMUNITY
Start: 2023-10-28

## 2023-12-01 ASSESSMENT — PATIENT HEALTH QUESTIONNAIRE - PHQ9
2. FEELING DOWN, DEPRESSED OR HOPELESS: 0
DEPRESSION UNABLE TO ASSESS: FUNCTIONAL CAPACITY MOTIVATION LIMITS ACCURACY
SUM OF ALL RESPONSES TO PHQ9 QUESTIONS 1 & 2: 0
SUM OF ALL RESPONSES TO PHQ QUESTIONS 1-9: 0
1. LITTLE INTEREST OR PLEASURE IN DOING THINGS: 0
SUM OF ALL RESPONSES TO PHQ QUESTIONS 1-9: 0

## 2023-12-01 NOTE — PROGRESS NOTES
HISTORY OF PRESENT ILLNESS  Kelin Orozco is a 82 y.o. female. Patient with A fib,htn,mr,ai,tr. On follow up patient denies any chest pains,sob, palpitation or other significant symptoms. 12/2020  Patient seen today for follow-up. Denies any chest pain or shortness of breath. Few months ago patient was feeling sluggish at that time after evaluation her statins were stopped. Patient feels better at present. 2/2021  Patient is here for follow-up she was admitted recently in hospital with atrial fibrillation with slow ventricular rate in 30s. AV desiree blocking agent were discontinued in form of Coreg and Cardizem. Since then she has been doing better. Denies any chest pain palpitation shortness of breath or other symptom at present  6/2020  Patient seen today for follow-up. Has been noticing swelling on feet for about 2 weeks    Follow-up  Pertinent negatives include no chest pain, no abdominal pain, no headaches and no shortness of breath. Palpitations   The history is provided by the Patient. This is a recurrent problem. The problem has not changed since onset. The problem occurs constantly. The problem is associated with nothing. Pertinent negatives include no fever, no chest pain, no claudication, no orthopnea, no PND, no abdominal pain, no nausea, no vomiting, no headaches, no dizziness, no weakness, no cough, no hemoptysis, no shortness of breath and no sputum production. Her past medical history is significant for hypertension. Hypertension  Pertinent negatives include no chest pain, no abdominal pain, no headaches and no shortness of breath. Cholesterol Problem  Pertinent negatives include no chest pain, no abdominal pain, no headaches and no shortness of breath. Valvular Heart Disease  The history is provided by the Patient. This is a chronic problem. The problem occurs constantly. The problem has not changed since onset. Pertinent negatives include no chest pain, no abdominal pain, no headaches

## 2023-12-01 NOTE — PROGRESS NOTES
1. Have you been to the ER, urgent care clinic since your last visit? Hospitalized since your last visit? No    2. Have you seen or consulted any other health care providers outside of the [address] [place] [address] since your last visit? Include any pap smears or colon screening.       No

## 2023-12-05 RX ORDER — HYDROCHLOROTHIAZIDE 12.5 MG/1
TABLET ORAL
Qty: 90 TABLET | Refills: 3 | Status: SHIPPED | OUTPATIENT
Start: 2023-12-05

## 2024-01-06 NOTE — PROGRESS NOTES
1. Have you been to the ER, urgent care clinic since your last visit? Hospitalized since your last visit? No    2. Have you seen or consulted any other health care providers outside of the [facility] since your last visit? Include any pap smears or colon screening. Yes Where: pcp     3. Since your last visit, have you had any of the following symptoms? no         4. Have you had any blood work, X-rays or cardiac testing?       Yes Where: Loren Cogan 06-Jan-2024 22:15

## 2024-02-19 RX ORDER — AMLODIPINE BESYLATE 5 MG/1
TABLET ORAL
Qty: 90 TABLET | Refills: 3 | Status: SHIPPED | OUTPATIENT
Start: 2024-02-19

## 2024-03-12 DIAGNOSIS — I48.19 OTHER PERSISTENT ATRIAL FIBRILLATION (HCC): Primary | ICD-10-CM

## 2024-03-12 NOTE — TELEPHONE ENCOUNTER
Requested Prescriptions     Pending Prescriptions Disp Refills    apixaban (ELIQUIS) 5 MG TABS tablet 180 tablet 3     Sig: Take 1 tablet by mouth 2 times daily

## 2024-07-23 ENCOUNTER — OFFICE VISIT (OUTPATIENT)
Age: 89
End: 2024-07-23
Payer: MEDICARE

## 2024-07-23 VITALS
DIASTOLIC BLOOD PRESSURE: 80 MMHG | HEIGHT: 66 IN | WEIGHT: 173 LBS | SYSTOLIC BLOOD PRESSURE: 137 MMHG | HEART RATE: 64 BPM | BODY MASS INDEX: 27.8 KG/M2 | OXYGEN SATURATION: 93 %

## 2024-07-23 DIAGNOSIS — I48.19 OTHER PERSISTENT ATRIAL FIBRILLATION (HCC): ICD-10-CM

## 2024-07-23 DIAGNOSIS — Z79.01 LONG TERM (CURRENT) USE OF ANTICOAGULANTS: ICD-10-CM

## 2024-07-23 DIAGNOSIS — I10 ESSENTIAL (PRIMARY) HYPERTENSION: ICD-10-CM

## 2024-07-23 DIAGNOSIS — E78.00 PURE HYPERCHOLESTEROLEMIA, UNSPECIFIED: ICD-10-CM

## 2024-07-23 DIAGNOSIS — I08.0 MITRAL AND AORTIC REGURGITATION: Primary | ICD-10-CM

## 2024-07-23 PROCEDURE — G8427 DOCREV CUR MEDS BY ELIG CLIN: HCPCS | Performed by: NURSE PRACTITIONER

## 2024-07-23 PROCEDURE — G8419 CALC BMI OUT NRM PARAM NOF/U: HCPCS | Performed by: NURSE PRACTITIONER

## 2024-07-23 PROCEDURE — 1123F ACP DISCUSS/DSCN MKR DOCD: CPT | Performed by: NURSE PRACTITIONER

## 2024-07-23 PROCEDURE — 99214 OFFICE O/P EST MOD 30 MIN: CPT | Performed by: NURSE PRACTITIONER

## 2024-07-23 PROCEDURE — 1090F PRES/ABSN URINE INCON ASSESS: CPT | Performed by: NURSE PRACTITIONER

## 2024-07-23 PROCEDURE — 1036F TOBACCO NON-USER: CPT | Performed by: NURSE PRACTITIONER

## 2024-07-23 PROCEDURE — 93000 ELECTROCARDIOGRAM COMPLETE: CPT | Performed by: NURSE PRACTITIONER

## 2024-07-23 NOTE — PROGRESS NOTES
Martín Ying MD   hydroCHLOROthiazide (HYDRODIURIL) 12.5 MG tablet TAKE 1 TABLET EVERY DAY 12/5/23  Yes Dianne Sandoval MD   oxybutynin (DITROPAN-XL) 10 MG extended release tablet  10/28/23  Yes Provider, MD Yong   vibegron (GEMTESA) 75 MG TABS tablet Take 1 tablet by mouth daily 9/28/23  Yes Sophia Denise MD   Calcium 200 MG TABS Take by mouth Twice a Week   Yes Provider, MD Yong   acetaminophen (TYLENOL) 650 MG extended release tablet Take 1 tablet by mouth 2 times daily   Yes Automatic Reconciliation, Ar   baclofen (LIORESAL) 10 MG tablet Take by mouth as needed   Yes Automatic Reconciliation, Ar   vitamin D (CHOLECALCIFEROL) 25 MCG (1000 UT) TABS tablet Take by mouth twice a week   Yes Automatic Reconciliation, Ar   Glucosamine Sulfate 1000 MG TABS Take by mouth 2 times daily   Yes Automatic Reconciliation, Ar   levothyroxine (SYNTHROID) 50 MCG tablet Take by mouth every morning (before breakfast)   Yes Automatic Reconciliation, Ar   losartan (COZAAR) 100 MG tablet Take by mouth daily   Yes Automatic Reconciliation, Ar   pravastatin (PRAVACHOL) 40 MG tablet Take 1 tablet by mouth   Yes Automatic Reconciliation, Ar   traZODone (DESYREL) 100 MG tablet ceived the following from [facility]: Outside name: traZODone (DESYREL) 100 mg tablet 10/14/22  Yes Automatic Reconciliation, Ar         /80   Pulse 64   Ht 1.676 m (5' 6\")   Wt 78.5 kg (173 lb)   SpO2 93%   BMI 27.92 kg/m²     Physical Exam  Constitutional:       Appearance: She is well-developed.   HENT:      Head: Normocephalic and atraumatic.   Eyes:      Conjunctiva/sclera: Conjunctivae normal.   Neck:      Thyroid: No thyromegaly.      Vascular: No JVD.      Trachea: No tracheal deviation.   Cardiovascular:      Rate and Rhythm: Normal rate and regular rhythm.      Chest Wall: PMI is not displaced.      Pulses: No decreased pulses.      Heart sounds: Murmur heard.   Early systolic murmur is present at the

## 2024-11-19 DIAGNOSIS — I48.19 OTHER PERSISTENT ATRIAL FIBRILLATION (HCC): ICD-10-CM

## 2025-01-10 ENCOUNTER — OFFICE VISIT (OUTPATIENT)
Age: 89
End: 2025-01-10
Payer: MEDICARE

## 2025-01-10 VITALS
HEART RATE: 66 BPM | SYSTOLIC BLOOD PRESSURE: 125 MMHG | WEIGHT: 176 LBS | BODY MASS INDEX: 28.28 KG/M2 | HEIGHT: 66 IN | DIASTOLIC BLOOD PRESSURE: 72 MMHG

## 2025-01-10 DIAGNOSIS — I48.19 OTHER PERSISTENT ATRIAL FIBRILLATION (HCC): Primary | ICD-10-CM

## 2025-01-10 DIAGNOSIS — Z79.01 LONG TERM (CURRENT) USE OF ANTICOAGULANTS: ICD-10-CM

## 2025-01-10 DIAGNOSIS — E78.00 PURE HYPERCHOLESTEROLEMIA, UNSPECIFIED: ICD-10-CM

## 2025-01-10 DIAGNOSIS — I10 ESSENTIAL (PRIMARY) HYPERTENSION: ICD-10-CM

## 2025-01-10 DIAGNOSIS — I08.0 MITRAL AND AORTIC REGURGITATION: ICD-10-CM

## 2025-01-10 PROCEDURE — G8427 DOCREV CUR MEDS BY ELIG CLIN: HCPCS | Performed by: NURSE PRACTITIONER

## 2025-01-10 PROCEDURE — G8419 CALC BMI OUT NRM PARAM NOF/U: HCPCS | Performed by: NURSE PRACTITIONER

## 2025-01-10 PROCEDURE — 1123F ACP DISCUSS/DSCN MKR DOCD: CPT | Performed by: NURSE PRACTITIONER

## 2025-01-10 PROCEDURE — 1036F TOBACCO NON-USER: CPT | Performed by: NURSE PRACTITIONER

## 2025-01-10 PROCEDURE — 99214 OFFICE O/P EST MOD 30 MIN: CPT | Performed by: NURSE PRACTITIONER

## 2025-01-10 PROCEDURE — 1090F PRES/ABSN URINE INCON ASSESS: CPT | Performed by: NURSE PRACTITIONER

## 2025-01-10 ASSESSMENT — PATIENT HEALTH QUESTIONNAIRE - PHQ9
DEPRESSION UNABLE TO ASSESS: FUNCTIONAL CAPACITY MOTIVATION LIMITS ACCURACY
SUM OF ALL RESPONSES TO PHQ QUESTIONS 1-9: 0
SUM OF ALL RESPONSES TO PHQ QUESTIONS 1-9: 0
1. LITTLE INTEREST OR PLEASURE IN DOING THINGS: NOT AT ALL
SUM OF ALL RESPONSES TO PHQ9 QUESTIONS 1 & 2: 0
2. FEELING DOWN, DEPRESSED OR HOPELESS: NOT AT ALL
SUM OF ALL RESPONSES TO PHQ QUESTIONS 1-9: 0
SUM OF ALL RESPONSES TO PHQ QUESTIONS 1-9: 0

## 2025-01-10 NOTE — PROGRESS NOTES
extra dose of HCTZ as needed. Monitor  12/2021  Cardiac status stable.  Edema stable.  Continue current medical management.  6/2022  Stable cardiac status.  Rate controlled continue current medical management.  Blood pressure controlled  12/2022  Stable cardiac status.  Blood pressure controlled continue treatment    6/2023 clinically stable.  Blood pressure controlled.  Tolerating anticoagulation monitor.  Mild increased LDL currently takes pravastatin 3 times a week we will continue to monitor  11/2023  Cardiac status stable continue current medical management.  Tolerating anticoagulation.  7/2024  Cardiac status is stable.  In office EKG A-fib with controlled ventricular response reports having routine labs with PCP blood pressure is controlled we will continue current medications encouraged walking daily for exercise  1/10/2025  Cardiac status is stable patient with history of atrial fibrillation rate controlled denies symptoms continue anticoagulation with Eliquis twice daily tolerating without adverse events.  Most recent labs CBC CMP lipids reviewed and discussed with patient no significant abnormalities blood pressure is controlled we will continue current cardiac medications

## 2025-01-10 NOTE — PROGRESS NOTES
1. Have you been to the ER, urgent care clinic since your last visit?  Hospitalized since your last visit?     No    2. Have you seen or consulted any other health care providers outside of the [facility] since your last visit?  Include any pap smears or colon screening.      No

## 2025-01-11 DIAGNOSIS — I48.19 OTHER PERSISTENT ATRIAL FIBRILLATION (HCC): ICD-10-CM

## 2025-04-08 RX ORDER — HYDROCHLOROTHIAZIDE 12.5 MG/1
25 TABLET ORAL DAILY
Qty: 90 TABLET | Refills: 0 | Status: SHIPPED | OUTPATIENT
Start: 2025-04-08

## 2025-05-16 RX ORDER — HYDROCHLOROTHIAZIDE 12.5 MG/1
25 TABLET ORAL DAILY
Qty: 90 TABLET | Refills: 0 | Status: SHIPPED | OUTPATIENT
Start: 2025-05-16

## 2025-06-30 RX ORDER — HYDROCHLOROTHIAZIDE 25 MG/1
25 TABLET ORAL DAILY
Qty: 90 TABLET | Refills: 3 | Status: SHIPPED | OUTPATIENT
Start: 2025-06-30

## 2025-07-11 ENCOUNTER — OFFICE VISIT (OUTPATIENT)
Age: 89
End: 2025-07-11

## 2025-07-11 VITALS
HEART RATE: 74 BPM | BODY MASS INDEX: 27.66 KG/M2 | SYSTOLIC BLOOD PRESSURE: 134 MMHG | OXYGEN SATURATION: 97 % | DIASTOLIC BLOOD PRESSURE: 78 MMHG | WEIGHT: 171.4 LBS

## 2025-07-11 DIAGNOSIS — I10 ESSENTIAL (PRIMARY) HYPERTENSION: ICD-10-CM

## 2025-07-11 DIAGNOSIS — I08.0 MITRAL AND AORTIC REGURGITATION: ICD-10-CM

## 2025-07-11 DIAGNOSIS — Z79.01 LONG TERM (CURRENT) USE OF ANTICOAGULANTS: ICD-10-CM

## 2025-07-11 DIAGNOSIS — I48.19 OTHER PERSISTENT ATRIAL FIBRILLATION (HCC): Primary | ICD-10-CM

## 2025-07-11 DIAGNOSIS — E78.00 PURE HYPERCHOLESTEROLEMIA, UNSPECIFIED: ICD-10-CM

## 2025-07-11 ASSESSMENT — PATIENT HEALTH QUESTIONNAIRE - PHQ9
SUM OF ALL RESPONSES TO PHQ QUESTIONS 1-9: 0
1. LITTLE INTEREST OR PLEASURE IN DOING THINGS: NOT AT ALL
2. FEELING DOWN, DEPRESSED OR HOPELESS: NOT AT ALL
SUM OF ALL RESPONSES TO PHQ QUESTIONS 1-9: 0

## 2025-07-11 NOTE — PROGRESS NOTES
1. Have you been to the ER, urgent care clinic since your last visit?  Hospitalized since your last visit?     No    2. Have you seen or consulted any other health care providers outside of the [facility] since your last visit?  Include any pap smears or colon screening.      No   
TSH  Echo Cardiogram Complete1/21/2021  [facility]  Component Name Value Ref Range   EF Echo 65     Result Impression   :   NORMAL LEFT VENTRICULAR CAVITY SIZE AND SYSTOLIC FUNCTION WITH A BI-PLANE EJECTION FRACTION   OF  65 %.  BASAL SEPTAL HYPERTROPHY OF THE LEFT VENTRICLE.   UNABLE TO ASSESS DIASTOLIC FUNCTION IN THIS PATIENT DUE TO IRREGULAR RHYTHM.   DILATED LEFT ATRIUM.   DILATED RIGHT ATRIUM.   MITRAL ANNULAR CALCIFICATION WITH MILD MITRAL REGURGITATION.   SCLEROTIC TRILEAFLET AORTIC VALVE WITH MILD AORTIC REGURGITATION.   MODERATE TRICUSPID REGURGITATION WITH A RVSP OF 42 MMHG   TRACE PULMONIC REGURGITATION.   NO EVIDENCE OF PERICARDIAL EFFUSION.   NO MASSES, SHUNTS OR THROMBI SEEN.     I Have personally reviewed recent relevant labs available and discussed with patient  4/2022-lipid, renal panel,       Assessment        Diagnosis Orders   1. Other persistent atrial fibrillation (HCC)        2. Mitral and aortic regurgitation        3. Essential (primary) hypertension        4. Pure hypercholesterolemia, unspecified        5. Long term (current) use of anticoagulants                There are no discontinued medications.    No orders of the defined types were placed in this encounter.      3/2018-recurrent a fib  Start eliquis and stop asa  ? Rate control v/s rhythm control -decide at next visit    4/2018  Decided to continue rate control and anticoagulation    6/2020  Cardiac status stable.  Continue with rate control and anticoagulation  12/2020  Cardiac status stable.  Mildly elevated blood pressure continue to monitor.  Off statin and gemfibrozil due to feeling sluggish.  She has felt better after stopping that.  Monitor clinically  2/2021  Recent admission with A. fib with slow ventricular rate in 30s.  Now off Coreg and Cardizem.  Rate remains controlled with persistent A. fib.  Continue rate control and anticoagulation.  Blood pressure control on amlodipine and HCTZ  6/2021  Cardiac status